# Patient Record
Sex: MALE | Race: WHITE | NOT HISPANIC OR LATINO | Employment: FULL TIME | URBAN - METROPOLITAN AREA
[De-identification: names, ages, dates, MRNs, and addresses within clinical notes are randomized per-mention and may not be internally consistent; named-entity substitution may affect disease eponyms.]

---

## 2017-02-01 ENCOUNTER — ALLSCRIPTS OFFICE VISIT (OUTPATIENT)
Dept: OTHER | Facility: OTHER | Age: 47
End: 2017-02-01

## 2017-02-01 DIAGNOSIS — Z13.1 ENCOUNTER FOR SCREENING FOR DIABETES MELLITUS: ICD-10-CM

## 2017-02-01 DIAGNOSIS — R53.83 OTHER FATIGUE: ICD-10-CM

## 2017-02-01 DIAGNOSIS — E78.5 HYPERLIPIDEMIA: ICD-10-CM

## 2017-02-21 ENCOUNTER — GENERIC CONVERSION - ENCOUNTER (OUTPATIENT)
Dept: OTHER | Facility: OTHER | Age: 47
End: 2017-02-21

## 2017-02-21 LAB
AMBIG ABBREV CMP14 DEFAULT (HISTORICAL): NORMAL
AMBIG ABBREV LP DEFAULT (HISTORICAL): NORMAL

## 2017-02-22 LAB
A/G RATIO (HISTORICAL): 1.6 (ref 1.1–2.5)
ALBUMIN SERPL BCP-MCNC: 4.4 G/DL (ref 3.5–5.5)
ALP SERPL-CCNC: 54 IU/L (ref 39–117)
ALT SERPL W P-5'-P-CCNC: 23 IU/L (ref 0–44)
AST SERPL W P-5'-P-CCNC: 21 IU/L (ref 0–40)
BASOPHILS # BLD AUTO: 0 %
BASOPHILS # BLD AUTO: 0 X10E3/UL (ref 0–0.2)
BILIRUB SERPL-MCNC: 0.4 MG/DL (ref 0–1.2)
BUN SERPL-MCNC: 12 MG/DL (ref 6–24)
BUN/CREA RATIO (HISTORICAL): 14 (ref 9–20)
CALCIUM SERPL-MCNC: 9.2 MG/DL (ref 8.7–10.2)
CHLORIDE SERPL-SCNC: 100 MMOL/L (ref 96–106)
CHOLEST SERPL-MCNC: 248 MG/DL (ref 100–199)
CO2 SERPL-SCNC: 25 MMOL/L (ref 18–29)
CREAT SERPL-MCNC: 0.87 MG/DL (ref 0.76–1.27)
DEPRECATED RDW RBC AUTO: 14.1 % (ref 12.3–15.4)
EGFR AFRICAN AMERICAN (HISTORICAL): 119 ML/MIN/1.73
EGFR-AMERICAN CALC (HISTORICAL): 103 ML/MIN/1.73
EOSINOPHIL # BLD AUTO: 0.3 X10E3/UL (ref 0–0.4)
EOSINOPHIL # BLD AUTO: 4 %
GLUCOSE SERPL-MCNC: 107 MG/DL (ref 65–99)
HCT VFR BLD AUTO: 50.8 % (ref 37.5–51)
HDLC SERPL-MCNC: 51 MG/DL
HGB BLD-MCNC: 17.4 G/DL (ref 12.6–17.7)
IMM.GRANULOCYTES (CD4/8) (HISTORICAL): 0 %
IMM.GRANULOCYTES (CD4/8) (HISTORICAL): 0 X10E3/UL (ref 0–0.1)
LDLC SERPL CALC-MCNC: 149 MG/DL (ref 0–99)
LYMPHOCYTES # BLD AUTO: 2.8 X10E3/UL (ref 0.7–3.1)
LYMPHOCYTES # BLD AUTO: 38 %
MCH RBC QN AUTO: 32.4 PG (ref 26.6–33)
MCHC RBC AUTO-ENTMCNC: 34.3 G/DL (ref 31.5–35.7)
MCV RBC AUTO: 95 FL (ref 79–97)
MONOCYTES # BLD AUTO: 0.6 X10E3/UL (ref 0.1–0.9)
MONOCYTES (HISTORICAL): 9 %
NEUTROPHILS # BLD AUTO: 3.6 X10E3/UL (ref 1.4–7)
NEUTROPHILS # BLD AUTO: 49 %
PLATELET # BLD AUTO: 219 X10E3/UL (ref 150–379)
POTASSIUM SERPL-SCNC: 4.5 MMOL/L (ref 3.5–5.2)
RBC (HISTORICAL): 5.37 X10E6/UL (ref 4.14–5.8)
SODIUM SERPL-SCNC: 142 MMOL/L (ref 134–144)
TOT. GLOBULIN, SERUM (HISTORICAL): 2.7 G/DL (ref 1.5–4.5)
TOTAL PROTEIN (HISTORICAL): 7.1 G/DL (ref 6–8.5)
TRIGL SERPL-MCNC: 241 MG/DL (ref 0–149)
TSH SERPL DL<=0.05 MIU/L-ACNC: 2.99 UIU/ML (ref 0.45–4.5)
VLDLC SERPL CALC-MCNC: 48 MG/DL (ref 5–40)
WBC # BLD AUTO: 7.4 X10E3/UL (ref 3.4–10.8)

## 2017-10-30 ENCOUNTER — ALLSCRIPTS OFFICE VISIT (OUTPATIENT)
Dept: OTHER | Facility: OTHER | Age: 47
End: 2017-10-30

## 2017-10-30 DIAGNOSIS — M54.41 LOW BACK PAIN WITH RIGHT-SIDED SCIATICA: ICD-10-CM

## 2017-10-31 NOTE — PROGRESS NOTES
Assessment  1  Right-sided low back pain with sciatica (724 3) (M54 41)    Plan  Health Maintenance    · You need to quit smoking ; Status:Complete - Retrospective Authorization;   Done:  [id number]  Right-sided low back pain with sciatica    · Start: Cyclobenzaprine HCl - 10 MG Oral Tablet; TAKE 1 TABLET AT BEDTIME   · Start: Meloxicam 15 MG Oral Tablet; TAKE 1 TABLET DAILY WITH FOOD   · *1 - [facility]  *  Status: Active  Requested for: [id number]  () Care Summary provided  : Yes    Discussion/Summary    Right sided sciatica- after discussing risks and benefits of medication with patient along with side effects will start meloxicam 15 mg PO once daily along with cyclobenzaprine 10 mg PO at bedtime  Also referred for physical therapy  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 and to be re-evaluated on Nov [address], 2017  up in 1 week  Possible side effects of new medications were reviewed with the patient/guardian today  The treatment plan was reviewed with the patient/guardian  The patient/guardian understands and agrees with the treatment plan      Chief Complaint  Patient is here today with complaints of back issues      History of Present Illness  Patient is here because he has been having pain on his right leg and going down his right leg  He says that all the symptoms started on Friday night all of the sudden  He says that he felt pain several months on his right gluteal area  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  says that he has a distant history of lumbar disc herniation per patient  says that he has been taking ibuprofen 400 mg PO twice daily today  Review of Systems    Constitutional: No fever or chills, feels well, no tiredness, no recent weight gain or weight loss  Eyes: No complaints of eye pain, no red eyes, no discharge from eyes, no itchy eyes     Cardiovascular: No complaints of slow heart rate, no fast heart rate, no chest pain, no palpitations, no leg claudication, no lower extremity  Respiratory: No complaints of shortness of breath, no wheezing, no cough, no SOB on exertion, no orthopnea or PND  Gastrointestinal: No complaints of abdominal pain, no constipation, no nausea or vomiting, no diarrhea or bloody stools  Musculoskeletal: as noted in HPI  Integumentary: No complaints of skin rash or skin lesions, no itching, no skin wound, no dry skin  Neurological: No compliants of headache, no confusion, no convulsions, no numbness or tingling, no dizziness or fainting, no limb weakness, no difficulty walking  Endocrine: No complaints of proptosis, no hot flashes, no muscle weakness, no erectile dysfunction, no deepening of the voice, no feelings of weakness  ROS reviewed  Active Problems  1  Abdominal pain, bilateral lower quadrant (789 03,789 04) (R10 31,R10 32)  2  Anhedonia (780 99) (R45 84)  3  Asthmatic bronchitis (493 90) (J45 909)  4  Chest tightness or pressure (786 59) (R07 89)  5  Diabetes mellitus screening (V77 1) (Z13 1)  6  Erectile dysfunction (607 84) (N52 9)  7  Fatigue (780 79) (R53 83)  8  Frequent loose stools (787 91) (R19 7)  9  Headache (784 0) (R51)  10  Hypercholesteremia (272 0) (E78 00)  11  Hyperlipidemia (272 4) (E78 5)  12  Need for influenza vaccination (V04 81) (Z23)  13  Nonsustained ventricular tachycardia (427 1) (I47 2)  14  Palpitations (785 1) (R00 2)  15  Paroxysmal supraventricular tachycardia (427 0) (I47 1)  16  Right wrist pain (719 43) (M25 531)  17  Seasonal allergies (477 9) (J30 2)  18  Tobacco use (305 1) (Z72 0)  19  Wrist fracture (814 00) (S62 109A)    Past Medical History    The active problems and past medical history were reviewed and updated today  Family History  Mother   1  Family history of Benign essential hypertension  2  Family history of diabetes mellitus (V18 0) (Z83 3)  3  Family history of Kidney carcinoma  Father   4  Family history of Benign essential hypertension  5  Family history of coronary artery disease (V17 3) (Z82 49)  6  Family history of myocardial infarction (V17 3) (Z82 49)  Maternal Grandmother   7  Family history of diabetes mellitus (V18 0) (Z83 3)  Paternal Grandmother   6  Family history of malignant neoplasm of breast (V16 3) (Z80 3)  Maternal Grandfather   9  Family history of diabetes mellitus (V18 0) (Z83 3)    Social History   · Alcohol use   · Disabled   ·    · No drug use   · Occasional alcohol use   · Tobacco use (305 1) (Z72 0)    Current Meds  1  Aspirin 81 MG TABS; TAKE 1 TABLET DAILY; Therapy: 39FHD9149 to (Evaluate:22Nov2014); Last Rx:[id number] Ordered  2  Viagra 50 MG Oral Tablet; TAKE 1 TABLET DAILY 1 HOUR BEFORE NEEDED; Therapy: 40ZJM7667 to (Evaluate:78Lig2270)  Requested for: 13HLF6447; Last   Rx:[id number] Ordered    Allergies  1  epinephrine    Vitals  Vital Signs    Recorded: 30Oct2017 01:17PM   Temperature 98 4 F   Heart Rate 79   Systolic 375   Diastolic 82   Height 5 ft 9 in   Weight 187 lb 8 0 oz   BMI Calculated 27 69   BSA Calculated 2 01   O2 Saturation 96     Physical Exam    Constitutional   General appearance: No acute distress, well appearing and well nourished  Eyes   Conjunctiva and lids: No swelling, erythema, or discharge  Pulmonary   Respiratory effort: No increased work of breathing or signs of respiratory distress  Auscultation of lungs: Clear to auscultation, equal breath sounds bilaterally, no wheezes, no rales, no rhonci  Cardiovascular   Auscultation of heart: Normal rate and rhythm, normal S1 and S2, without murmurs  Musculoskeletal   Gait and station: Abnormal  -- unable to sit due to pain  -- straight leg raise produces tenderness of gluteal area, left straight leg raise does not produce any tenderness  Skin   Skin and subcutaneous tissue: Normal without rashes or lesions      Psychiatric   Orientation to person, place and time: Normal          Health Management  Abdominal pain, bilateral lower quadrant   COLONOSCOPY; every 10 years; Last 11Aug2015; Next Due: 11Aug2025; Active    Message    Arnaldo Louis is under my professional care   He was seen in my office on 10/30/217   He is able to return to work on  11/06/2017            Signatures   Electronically signed by : Horacio Tenorio MD; Oct 30 2017  1:44PM EST                       (Author)    Electronically signed by : Horacio Tenorio MD; Oct 30 2017  1:45PM EST                       (Author)

## 2018-01-14 VITALS
HEART RATE: 78 BPM | HEIGHT: 69 IN | BODY MASS INDEX: 28.01 KG/M2 | OXYGEN SATURATION: 97 % | WEIGHT: 189.13 LBS | DIASTOLIC BLOOD PRESSURE: 80 MMHG | TEMPERATURE: 97.4 F | SYSTOLIC BLOOD PRESSURE: 118 MMHG

## 2018-01-14 VITALS
BODY MASS INDEX: 27.77 KG/M2 | OXYGEN SATURATION: 96 % | HEIGHT: 69 IN | SYSTOLIC BLOOD PRESSURE: 132 MMHG | HEART RATE: 79 BPM | TEMPERATURE: 98.4 F | WEIGHT: 187.5 LBS | DIASTOLIC BLOOD PRESSURE: 82 MMHG

## 2018-01-15 NOTE — MISCELLANEOUS
Message  Return to work or school:   Verne Cheadle is under my professional care   He was seen in my office on 10/30/217   He is able to return to work on  11/06/2017            Signatures   Electronically signed by : Rajesh Qiunteros MD; Oct 30 2017  1:44PM EST                       (Author)    Electronically signed by : Rajesh Quinteros MD; Oct 30 2017  1:45PM EST                       (Author)

## 2019-02-08 DIAGNOSIS — J01.00 ACUTE NON-RECURRENT MAXILLARY SINUSITIS: Primary | ICD-10-CM

## 2019-02-08 RX ORDER — AZITHROMYCIN 250 MG/1
250 TABLET, FILM COATED ORAL EVERY 24 HOURS
Qty: 5 TABLET | Refills: 0 | Status: SHIPPED | OUTPATIENT
Start: 2019-02-08 | End: 2019-02-13

## 2020-03-20 ENCOUNTER — TELEPHONE (OUTPATIENT)
Dept: FAMILY MEDICINE CLINIC | Facility: CLINIC | Age: 50
End: 2020-03-20

## 2020-03-20 DIAGNOSIS — R09.81 SINUS CONGESTION: Primary | ICD-10-CM

## 2020-03-20 RX ORDER — FLUTICASONE PROPIONATE 50 MCG
1 SPRAY, SUSPENSION (ML) NASAL DAILY
Qty: 1 BOTTLE | Refills: 1 | Status: SHIPPED | OUTPATIENT
Start: 2020-03-20 | End: 2021-10-07 | Stop reason: ALTCHOICE

## 2020-03-20 NOTE — TELEPHONE ENCOUNTER
Pt calls with his usual seasonal allergies to include sore throat, sinus congestion, slight cough, phleghm   No fever,  [facility]     Thanks rb

## 2021-04-08 DIAGNOSIS — Z23 ENCOUNTER FOR IMMUNIZATION: ICD-10-CM

## 2021-08-17 ENCOUNTER — TELEPHONE (OUTPATIENT)
Dept: ADMINISTRATIVE | Facility: OTHER | Age: 51
End: 2021-08-17

## 2021-08-17 ENCOUNTER — APPOINTMENT (OUTPATIENT)
Dept: LAB | Facility: CLINIC | Age: 51
End: 2021-08-17
Payer: COMMERCIAL

## 2021-08-17 ENCOUNTER — OFFICE VISIT (OUTPATIENT)
Dept: FAMILY MEDICINE CLINIC | Facility: CLINIC | Age: 51
End: 2021-08-17
Payer: COMMERCIAL

## 2021-08-17 VITALS
SYSTOLIC BLOOD PRESSURE: 118 MMHG | WEIGHT: 185.4 LBS | DIASTOLIC BLOOD PRESSURE: 74 MMHG | TEMPERATURE: 97.8 F | HEART RATE: 74 BPM | BODY MASS INDEX: 27.46 KG/M2 | HEIGHT: 69 IN | OXYGEN SATURATION: 99 %

## 2021-08-17 DIAGNOSIS — Z13.1 SCREENING FOR DIABETES MELLITUS: ICD-10-CM

## 2021-08-17 DIAGNOSIS — Z12.5 SCREENING FOR PROSTATE CANCER: ICD-10-CM

## 2021-08-17 DIAGNOSIS — F17.210 CIGARETTE NICOTINE DEPENDENCE WITHOUT COMPLICATION: ICD-10-CM

## 2021-08-17 DIAGNOSIS — Z13.220 NEED FOR LIPID SCREENING: ICD-10-CM

## 2021-08-17 DIAGNOSIS — Z00.00 HEALTH MAINTENANCE EXAMINATION: Primary | ICD-10-CM

## 2021-08-17 DIAGNOSIS — B35.1 ONYCHOMYCOSIS: ICD-10-CM

## 2021-08-17 LAB
ALBUMIN SERPL BCP-MCNC: 3.7 G/DL (ref 3.5–5)
ALP SERPL-CCNC: 53 U/L (ref 46–116)
ALT SERPL W P-5'-P-CCNC: 29 U/L (ref 12–78)
ANION GAP SERPL CALCULATED.3IONS-SCNC: 2 MMOL/L (ref 4–13)
AST SERPL W P-5'-P-CCNC: 20 U/L (ref 5–45)
BILIRUB SERPL-MCNC: 0.45 MG/DL (ref 0.2–1)
BUN SERPL-MCNC: 12 MG/DL (ref 5–25)
CALCIUM SERPL-MCNC: 8.8 MG/DL (ref 8.3–10.1)
CHLORIDE SERPL-SCNC: 107 MMOL/L (ref 100–108)
CHOLEST SERPL-MCNC: 216 MG/DL (ref 50–200)
CO2 SERPL-SCNC: 28 MMOL/L (ref 21–32)
CREAT SERPL-MCNC: 0.81 MG/DL (ref 0.6–1.3)
GFR SERPL CREATININE-BSD FRML MDRD: 103 ML/MIN/1.73SQ M
GLUCOSE P FAST SERPL-MCNC: 102 MG/DL (ref 65–99)
HDLC SERPL-MCNC: 50 MG/DL
LDLC SERPL CALC-MCNC: 132 MG/DL (ref 0–100)
NONHDLC SERPL-MCNC: 166 MG/DL
POTASSIUM SERPL-SCNC: 4.1 MMOL/L (ref 3.5–5.3)
PROT SERPL-MCNC: 7.5 G/DL (ref 6.4–8.2)
PSA SERPL-MCNC: 0.8 NG/ML (ref 0–4)
SODIUM SERPL-SCNC: 137 MMOL/L (ref 136–145)
TRIGL SERPL-MCNC: 170 MG/DL

## 2021-08-17 PROCEDURE — 99396 PREV VISIT EST AGE 40-64: CPT | Performed by: FAMILY MEDICINE

## 2021-08-17 PROCEDURE — G0103 PSA SCREENING: HCPCS

## 2021-08-17 PROCEDURE — 80061 LIPID PANEL: CPT

## 2021-08-17 PROCEDURE — 36415 COLL VENOUS BLD VENIPUNCTURE: CPT

## 2021-08-17 PROCEDURE — 80053 COMPREHEN METABOLIC PANEL: CPT

## 2021-08-17 RX ORDER — TERBINAFINE HYDROCHLORIDE 250 MG/1
250 TABLET ORAL DAILY
Qty: 30 TABLET | Refills: 2 | Status: SHIPPED | OUTPATIENT
Start: 2021-08-17 | End: 2021-09-16

## 2021-08-17 RX ORDER — ASPIRIN 81 MG/1
81 TABLET ORAL DAILY
COMMUNITY

## 2021-08-17 NOTE — PROGRESS NOTES
Assessment/Plan:    No problem-specific Assessment & Plan notes found for this encounter  Diagnoses and all orders for this visit:    Health maintenance examination  Normal exam see below    Cigarette nicotine dependence without complication  -     CT lung screening program; Future    Screening for diabetes mellitus  -     Comprehensive metabolic panel; Future    Need for lipid screening  -     Lipid panel; Future    Screening for prostate cancer  -     PSA, Total Screen; Future    Onychomycosis  -     terbinafine (LamISIL) 250 mg tablet; Take 1 tablet (250 mg total) by mouth daily    Other orders  -     aspirin (ECOTRIN LOW STRENGTH) 81 mg EC tablet; Take 81 mg by mouth daily      Follow up in 1 year or as needed    Subjective:      Patient ID: Jose Angel Sunshine is a 45 y o  male  Patient is here for a yearly physical   He is a current every day smoker not ready to quit  Has bilateral toenail fungus infection  The following portions of the patient's history were reviewed and updated as appropriate:   He  has no past medical history on file  He   Patient Active Problem List    Diagnosis Date Noted    Health maintenance examination 08/17/2021     He  has no past surgical history on file  His family history is not on file  He  reports that he has been smoking  He has never used smokeless tobacco  No history on file for alcohol use and drug use  Current Outpatient Medications   Medication Sig Dispense Refill    aspirin (ECOTRIN LOW STRENGTH) 81 mg EC tablet Take 81 mg by mouth daily      fluticasone (FLONASE) 50 mcg/act nasal spray 1 spray into each nostril daily (Patient not taking: Reported on 8/17/2021) 1 Bottle 1    terbinafine (LamISIL) 250 mg tablet Take 1 tablet (250 mg total) by mouth daily 30 tablet 2     No current facility-administered medications for this visit       Current Outpatient Medications on File Prior to Visit   Medication Sig    aspirin (ECOTRIN LOW STRENGTH) 81 mg EC tablet Take 81 mg by mouth daily    fluticasone (FLONASE) 50 mcg/act nasal spray 1 spray into each nostril daily (Patient not taking: Reported on 8/17/2021)     No current facility-administered medications on file prior to visit  He is allergic to ephedrine       Review of Systems   Constitutional: Negative for activity change, appetite change, fatigue and fever  HENT: Negative for congestion and ear discharge  Respiratory: Negative for cough and shortness of breath  Cardiovascular: Negative for chest pain and palpitations  Gastrointestinal: Negative for diarrhea and nausea  Musculoskeletal: Negative for arthralgias and back pain  Skin: Negative for color change and rash  Neurological: Negative for dizziness and headaches  Psychiatric/Behavioral: Negative for agitation and behavioral problems  Objective:      /74   Pulse 74   Temp 97 8 °F (36 6 °C) (Temporal)   Ht 5' 9" (1 753 m)   Wt 84 1 kg (185 lb 6 4 oz)   SpO2 99%   BMI 27 38 kg/m²          Physical Exam  Constitutional:       General: He is not in acute distress  Appearance: He is well-developed  He is not diaphoretic  Eyes:      General: No scleral icterus  Pupils: Pupils are equal, round, and reactive to light  Cardiovascular:      Rate and Rhythm: Normal rate and regular rhythm  Heart sounds: Normal heart sounds  No murmur heard  Pulmonary:      Effort: Pulmonary effort is normal  No respiratory distress  Breath sounds: Normal breath sounds  No wheezing  Abdominal:      General: Bowel sounds are normal  There is no distension  Palpations: Abdomen is soft  Tenderness: There is no abdominal tenderness  Skin:     General: Skin is warm and dry  Findings: No rash  Comments: Bilateral toenails yellow and thickened   Neurological:      Mental Status: He is alert and oriented to person, place, and time

## 2021-08-17 NOTE — TELEPHONE ENCOUNTER
----- Message from Sunday Pritchard sent at 8/17/2021  8:24 AM EDT -----  Regarding: Colonoscopy  08/17/21 8:25 AM    Hello, our patient Mukesh De La Vega has had CRC: Colonoscopy completed/performed  Please assist in updating the patient chart by pulling the document from the Media Tab  The date of service is 8/11/2015       Thank you,  Sunday [facility]

## 2021-08-17 NOTE — PROGRESS NOTES
BMI Counseling: Body mass index is 27 38 kg/m²  The BMI is above normal  Nutrition recommendations include reducing portion sizes, decreasing overall calorie intake and 3-5 servings of fruits/vegetables daily  Exercise recommendations include exercising 3-5 times per week

## 2021-08-17 NOTE — TELEPHONE ENCOUNTER
Upon review of the In Basket request we were able to locate, review, and update the patient chart as requested for CRC: Colonoscopy  Any additional questions or concerns should be emailed to the Practice Liaisons via [email or web address] email, please do not reply via In Basket      Thank you  Teresa Toribio

## 2021-10-06 ENCOUNTER — TELEPHONE (OUTPATIENT)
Dept: FAMILY MEDICINE CLINIC | Facility: CLINIC | Age: 51
End: 2021-10-06

## 2021-10-06 DIAGNOSIS — R51.9 NONINTRACTABLE HEADACHE, UNSPECIFIED CHRONICITY PATTERN, UNSPECIFIED HEADACHE TYPE: Primary | ICD-10-CM

## 2021-10-06 RX ORDER — ACETAMINOPHEN, ASPIRIN AND CAFFEINE 250; 250; 65 MG/1; MG/1; MG/1
1 TABLET, FILM COATED ORAL EVERY 6 HOURS PRN
Qty: 30 TABLET | Refills: 0 | Status: SHIPPED | OUTPATIENT
Start: 2021-10-06

## 2021-10-07 ENCOUNTER — OFFICE VISIT (OUTPATIENT)
Dept: FAMILY MEDICINE CLINIC | Facility: CLINIC | Age: 51
End: 2021-10-07
Payer: COMMERCIAL

## 2021-10-07 VITALS
OXYGEN SATURATION: 97 % | DIASTOLIC BLOOD PRESSURE: 90 MMHG | HEIGHT: 69 IN | WEIGHT: 185 LBS | SYSTOLIC BLOOD PRESSURE: 140 MMHG | HEART RATE: 97 BPM | TEMPERATURE: 97.5 F | BODY MASS INDEX: 27.4 KG/M2

## 2021-10-07 DIAGNOSIS — G44.52 NEW DAILY PERSISTENT HEADACHE: ICD-10-CM

## 2021-10-07 DIAGNOSIS — I10 ESSENTIAL HYPERTENSION: Primary | ICD-10-CM

## 2021-10-07 PROCEDURE — 99214 OFFICE O/P EST MOD 30 MIN: CPT | Performed by: FAMILY MEDICINE

## 2021-10-07 RX ORDER — PROPRANOLOL HYDROCHLORIDE 40 MG/1
40 TABLET ORAL 2 TIMES DAILY
Qty: 60 TABLET | Refills: 1 | Status: SHIPPED | OUTPATIENT
Start: 2021-10-07 | End: 2021-10-22 | Stop reason: SDUPTHER

## 2021-10-22 ENCOUNTER — HOSPITAL ENCOUNTER (OUTPATIENT)
Dept: CT IMAGING | Facility: HOSPITAL | Age: 51
Discharge: HOME | End: 2021-10-22
Attending: FAMILY MEDICINE
Payer: COMMERCIAL

## 2021-10-22 ENCOUNTER — OFFICE VISIT (OUTPATIENT)
Dept: FAMILY MEDICINE CLINIC | Facility: CLINIC | Age: 51
End: 2021-10-22
Payer: OTHER MISCELLANEOUS

## 2021-10-22 VITALS
WEIGHT: 184 LBS | HEART RATE: 68 BPM | HEIGHT: 69 IN | DIASTOLIC BLOOD PRESSURE: 92 MMHG | BODY MASS INDEX: 27.25 KG/M2 | TEMPERATURE: 97 F | SYSTOLIC BLOOD PRESSURE: 136 MMHG | OXYGEN SATURATION: 98 %
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
## 2021-10-22 DIAGNOSIS — T14.90XA TRAUMA: ICD-10-CM

## 2021-10-22 DIAGNOSIS — R10.12 LEFT UPPER QUADRANT ABDOMINAL PAIN: ICD-10-CM
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
## 2021-10-22 PROCEDURE — 74150 CT ABDOMEN W/O CONTRAST: CPT

## 2021-10-22 PROCEDURE — G1004 CDSM NDSC: HCPCS

## 2021-10-22 PROCEDURE — 99214 OFFICE O/P EST MOD 30 MIN: CPT | Performed by: FAMILY MEDICINE

## 2021-10-22 RX ORDER — IBUPROFEN 800 MG/1
800 TABLET ORAL EVERY 8 HOURS PRN
Qty: 30 TABLET | Refills: 1 | Status: SHIPPED | OUTPATIENT
Start: 2021-10-22 | End: 2021-11-05 | Stop reason: SDUPTHER

## 2021-10-22 RX ORDER — PROPRANOLOL HYDROCHLORIDE 40 MG/1
40 TABLET ORAL DAILY
Qty: 30 TABLET | Refills: 3
Start: 2021-10-22 | End: 2021-10-29

## 2021-10-25 ENCOUNTER — TELEPHONE (OUTPATIENT)
Dept: FAMILY MEDICINE CLINIC | Facility: CLINIC | Age: 51
End: 2021-10-25

## 2021-10-26 ENCOUNTER — DOCUMENTATION (OUTPATIENT)
Dept: FAMILY MEDICINE CLINIC | Facility: CLINIC | Age: 51
End: 2021-10-26

## 2021-10-29 ENCOUNTER — APPOINTMENT (OUTPATIENT)
Dept: RADIOLOGY | Facility: CLINIC | Age: 51
End: 2021-10-29
Payer: OTHER MISCELLANEOUS

## 2021-10-29 ENCOUNTER — OFFICE VISIT (OUTPATIENT)
Dept: FAMILY MEDICINE CLINIC | Facility: CLINIC | Age: 51
End: 2021-10-29
Payer: OTHER MISCELLANEOUS

## 2021-10-29 VITALS
HEIGHT: 69 IN | DIASTOLIC BLOOD PRESSURE: 92 MMHG | SYSTOLIC BLOOD PRESSURE: 136 MMHG | HEART RATE: 84 BPM | TEMPERATURE: 96.4 F | BODY MASS INDEX: 27.11 KG/M2 | WEIGHT: 183 LBS | OXYGEN SATURATION: 99 %

## 2021-10-29 DIAGNOSIS — R07.81 RIB PAIN ON LEFT SIDE: ICD-10-CM
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
## 2021-10-29 DIAGNOSIS — T14.90XA TRAUMA: Primary | ICD-10-CM

## 2021-10-29 PROCEDURE — 71101 X-RAY EXAM UNILAT RIBS/CHEST: CPT

## 2021-10-29 PROCEDURE — 99213 OFFICE O/P EST LOW 20 MIN: CPT | Performed by: FAMILY MEDICINE

## 2021-11-05 ENCOUNTER — OFFICE VISIT (OUTPATIENT)
Dept: FAMILY MEDICINE CLINIC | Facility: CLINIC | Age: 51
End: 2021-11-05
Payer: OTHER MISCELLANEOUS

## 2021-11-05 VITALS
OXYGEN SATURATION: 99 % | BODY MASS INDEX: 27.99 KG/M2 | WEIGHT: 189 LBS | SYSTOLIC BLOOD PRESSURE: 118 MMHG | DIASTOLIC BLOOD PRESSURE: 80 MMHG | TEMPERATURE: 77 F | HEIGHT: 69 IN | HEART RATE: 97 BPM

## 2021-11-05 DIAGNOSIS — S80.811D ABRASION OF RIGHT LOWER EXTREMITY, SUBSEQUENT ENCOUNTER: ICD-10-CM

## 2021-11-05 DIAGNOSIS — S20.212S RIB CONTUSION, LEFT, SEQUELA: ICD-10-CM
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
## 2021-11-05 DIAGNOSIS — M25.561 ACUTE PAIN OF RIGHT KNEE: ICD-10-CM

## 2021-11-05 PROBLEM — S80.811A ABRASION OF RIGHT LEG: Status: ACTIVE | Noted: 2021-11-05

## 2021-11-05 PROCEDURE — 99214 OFFICE O/P EST MOD 30 MIN: CPT | Performed by: FAMILY MEDICINE

## 2021-11-05 RX ORDER — IBUPROFEN 800 MG/1
800 TABLET ORAL EVERY 8 HOURS PRN
Qty: 60 TABLET | Refills: 3 | Status: SHIPPED | OUTPATIENT
Start: 2021-11-05 | End: 2022-02-21 | Stop reason: ALTCHOICE

## 2021-11-05 RX ORDER — PROPRANOLOL HYDROCHLORIDE 40 MG/1
TABLET ORAL
COMMUNITY
Start: 2021-11-03 | End: 2021-12-01

## 2021-11-08 ENCOUNTER — TELEPHONE (OUTPATIENT)
Dept: FAMILY MEDICINE CLINIC | Facility: CLINIC | Age: 51
End: 2021-11-08

## 2021-11-09 VITALS
HEART RATE: 79 BPM | BODY MASS INDEX: 27.37 KG/M2 | DIASTOLIC BLOOD PRESSURE: 78 MMHG | SYSTOLIC BLOOD PRESSURE: 120 MMHG | HEIGHT: 69 IN | WEIGHT: 184.8 LBS

## 2021-11-09 DIAGNOSIS — S80.11XA CONTUSION OF RIGHT LOWER LEG, INITIAL ENCOUNTER: ICD-10-CM

## 2021-11-09 DIAGNOSIS — S89.91XA ACUTE INJURY OF RIGHT KNEE CARTILAGE, INITIAL ENCOUNTER: Primary | ICD-10-CM

## 2021-11-09 DIAGNOSIS — S89.92XA LEFT KNEE INJURY, INITIAL ENCOUNTER: ICD-10-CM

## 2021-11-09 PROCEDURE — 99203 OFFICE O/P NEW LOW 30 MIN: CPT | Performed by: FAMILY MEDICINE

## 2021-11-17 ENCOUNTER — OFFICE VISIT (OUTPATIENT)
Dept: FAMILY MEDICINE CLINIC | Facility: CLINIC | Age: 51
End: 2021-11-17
Payer: OTHER MISCELLANEOUS

## 2021-11-17 VITALS
HEART RATE: 83 BPM | SYSTOLIC BLOOD PRESSURE: 133 MMHG | BODY MASS INDEX: 27.25 KG/M2 | WEIGHT: 184 LBS | HEIGHT: 69 IN | DIASTOLIC BLOOD PRESSURE: 86 MMHG

## 2021-11-17 VITALS
TEMPERATURE: 97.4 F | BODY MASS INDEX: 27.7 KG/M2 | DIASTOLIC BLOOD PRESSURE: 81 MMHG | OXYGEN SATURATION: 97 % | WEIGHT: 187 LBS | HEART RATE: 89 BPM | HEIGHT: 69 IN | SYSTOLIC BLOOD PRESSURE: 137 MMHG

## 2021-11-17 DIAGNOSIS — R07.81 RIB PAIN ON LEFT SIDE: ICD-10-CM

## 2021-11-17 DIAGNOSIS — S89.92XD LEFT KNEE INJURY, SUBSEQUENT ENCOUNTER: ICD-10-CM
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
## 2021-11-17 DIAGNOSIS — S83.511D SPRAIN OF ANTERIOR CRUCIATE LIGAMENT OF RIGHT KNEE, SUBSEQUENT ENCOUNTER: Primary | ICD-10-CM

## 2021-11-17 DIAGNOSIS — S80.01XD CONTUSION OF RIGHT KNEE, SUBSEQUENT ENCOUNTER: ICD-10-CM

## 2021-11-17 DIAGNOSIS — T14.8XXA CONTUSION OF BONE: ICD-10-CM

## 2021-11-17 DIAGNOSIS — M25.562 ACUTE PAIN OF LEFT KNEE: ICD-10-CM

## 2021-11-17 PROBLEM — R10.12 LEFT UPPER QUADRANT ABDOMINAL PAIN: Status: RESOLVED | Noted: 2021-10-22 | Resolved: 2021-11-17

## 2021-11-17 PROCEDURE — 99213 OFFICE O/P EST LOW 20 MIN: CPT | Performed by: FAMILY MEDICINE

## 2021-11-17 RX ORDER — LIDOCAINE 50 MG/G
1 PATCH TOPICAL DAILY
Qty: 30 PATCH | Refills: 0 | Status: SHIPPED | OUTPATIENT
Start: 2021-11-17

## 2021-11-22 RX ORDER — LIDOCAINE 40 MG/G
CREAM TOPICAL AS NEEDED
Qty: 45 G | Refills: 2 | Status: SHIPPED | OUTPATIENT
Start: 2021-11-22 | End: 2021-12-17 | Stop reason: SDUPTHER

## 2021-11-30 DIAGNOSIS — I10 ESSENTIAL (PRIMARY) HYPERTENSION: ICD-10-CM

## 2021-11-30 DIAGNOSIS — G44.52 NEW DAILY PERSISTENT HEADACHE (NDPH): ICD-10-CM

## 2021-12-01 RX ORDER — PROPRANOLOL HYDROCHLORIDE 40 MG/1
TABLET ORAL
Qty: 60 TABLET | Refills: 1 | Status: SHIPPED | OUTPATIENT
Start: 2021-12-01 | End: 2022-01-24

## 2021-12-02 ENCOUNTER — OFFICE VISIT (OUTPATIENT)
Dept: FAMILY MEDICINE CLINIC | Facility: CLINIC | Age: 51
End: 2021-12-02
Payer: OTHER MISCELLANEOUS

## 2021-12-02 VITALS
WEIGHT: 187 LBS | OXYGEN SATURATION: 100 % | HEART RATE: 63 BPM | TEMPERATURE: 98.3 F | BODY MASS INDEX: 27.7 KG/M2 | SYSTOLIC BLOOD PRESSURE: 138 MMHG | DIASTOLIC BLOOD PRESSURE: 74 MMHG | HEIGHT: 69 IN

## 2021-12-02 DIAGNOSIS — R07.81 RIB PAIN ON LEFT SIDE: Primary | ICD-10-CM

## 2021-12-02 DIAGNOSIS — M25.561 ACUTE PAIN OF RIGHT KNEE: ICD-10-CM

## 2021-12-02 PROCEDURE — 99213 OFFICE O/P EST LOW 20 MIN: CPT | Performed by: FAMILY MEDICINE

## 2021-12-17 VITALS
HEART RATE: 76 BPM | HEIGHT: 69 IN | DIASTOLIC BLOOD PRESSURE: 87 MMHG | WEIGHT: 188 LBS | BODY MASS INDEX: 27.85 KG/M2 | SYSTOLIC BLOOD PRESSURE: 126 MMHG

## 2021-12-17 DIAGNOSIS — S83.511D SPRAIN OF ANTERIOR CRUCIATE LIGAMENT OF RIGHT KNEE, SUBSEQUENT ENCOUNTER: Primary | ICD-10-CM

## 2021-12-17 DIAGNOSIS — S80.01XD CONTUSION OF RIGHT KNEE, SUBSEQUENT ENCOUNTER: ICD-10-CM

## 2021-12-17 DIAGNOSIS — S89.92XD LEFT KNEE INJURY, SUBSEQUENT ENCOUNTER: ICD-10-CM

## 2021-12-17 DIAGNOSIS — T14.8XXA CONTUSION OF BONE: ICD-10-CM

## 2021-12-17 DIAGNOSIS — R07.81 RIB PAIN ON LEFT SIDE: ICD-10-CM

## 2021-12-17 PROCEDURE — 99213 OFFICE O/P EST LOW 20 MIN: CPT | Performed by: FAMILY MEDICINE

## 2021-12-17 RX ORDER — LIDOCAINE 40 MG/G
CREAM TOPICAL AS NEEDED
Qty: 120 G | Refills: 2 | Status: SHIPPED | OUTPATIENT
Start: 2021-12-17

## 2021-12-20 ENCOUNTER — OFFICE VISIT (OUTPATIENT)
Dept: FAMILY MEDICINE CLINIC | Facility: CLINIC | Age: 51
End: 2021-12-20
Payer: OTHER MISCELLANEOUS

## 2021-12-20 VITALS
DIASTOLIC BLOOD PRESSURE: 76 MMHG | HEIGHT: 69 IN | SYSTOLIC BLOOD PRESSURE: 110 MMHG | WEIGHT: 185 LBS | TEMPERATURE: 96 F | BODY MASS INDEX: 27.4 KG/M2 | OXYGEN SATURATION: 98 % | HEART RATE: 63 BPM

## 2021-12-20 DIAGNOSIS — R07.81 RIB PAIN ON LEFT SIDE: ICD-10-CM
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
## 2021-12-20 PROCEDURE — 99213 OFFICE O/P EST LOW 20 MIN: CPT | Performed by: FAMILY MEDICINE

## 2022-01-12 ENCOUNTER — IMMUNIZATIONS (OUTPATIENT)
Dept: FAMILY MEDICINE CLINIC | Facility: HOSPITAL | Age: 52
End: 2022-01-12

## 2022-01-12 DIAGNOSIS — Z23 ENCOUNTER FOR IMMUNIZATION: Primary | ICD-10-CM

## 2022-01-12 PROCEDURE — 0064A COVID-19 MODERNA VACC 0.25 ML BOOSTER: CPT

## 2022-01-12 PROCEDURE — 91306 COVID-19 MODERNA VACC 0.25 ML BOOSTER: CPT

## 2022-01-21 ENCOUNTER — OFFICE VISIT (OUTPATIENT)
Dept: OBGYN CLINIC | Facility: CLINIC | Age: 52
End: 2022-01-21
Payer: OTHER MISCELLANEOUS

## 2022-01-21 VITALS
HEART RATE: 85 BPM | BODY MASS INDEX: 27.25 KG/M2 | HEIGHT: 69 IN | WEIGHT: 184 LBS | SYSTOLIC BLOOD PRESSURE: 121 MMHG | DIASTOLIC BLOOD PRESSURE: 84 MMHG

## 2022-01-21 DIAGNOSIS — S89.92XD LEFT KNEE INJURY, SUBSEQUENT ENCOUNTER: ICD-10-CM

## 2022-01-21 DIAGNOSIS — R07.81 RIB PAIN ON LEFT SIDE: ICD-10-CM

## 2022-01-21 DIAGNOSIS — S80.01XD CONTUSION OF RIGHT KNEE, SUBSEQUENT ENCOUNTER: ICD-10-CM

## 2022-01-21 DIAGNOSIS — S83.511D SPRAIN OF ANTERIOR CRUCIATE LIGAMENT OF RIGHT KNEE, SUBSEQUENT ENCOUNTER: Primary | ICD-10-CM

## 2022-01-21 DIAGNOSIS — M25.361 PATELLAR INSTABILITY OF RIGHT KNEE: ICD-10-CM

## 2022-01-21 PROBLEM — S83.511A SPRAIN OF ANTERIOR CRUCIATE LIGAMENT OF RIGHT KNEE: Status: ACTIVE | Noted: 2022-01-21

## 2022-01-21 PROBLEM — S80.01XA CONTUSION OF RIGHT KNEE: Status: ACTIVE | Noted: 2022-01-21

## 2022-01-21 PROCEDURE — 99213 OFFICE O/P EST LOW 20 MIN: CPT | Performed by: FAMILY MEDICINE

## 2022-01-21 RX ORDER — TERBINAFINE HYDROCHLORIDE 250 MG/1
TABLET ORAL
COMMUNITY
Start: 2021-12-29

## 2022-01-21 NOTE — PROGRESS NOTES
Assessment/Plan:  Assessment/Plan   Diagnoses and all orders for this visit:    Sprain of anterior cruciate ligament of right knee, subsequent encounter  -     Ambulatory Referral to Physical Therapy; Future    Contusion of right knee, subsequent encounter  -     Ambulatory Referral to Physical Therapy; Future    Left knee injury, subsequent encounter  -     Ambulatory Referral to Physical Therapy; Future    Rib pain on left side  -     Ambulatory Referral to Physical Therapy; Future    Patellar instability of right knee  -     Brace    Other orders  -     terbinafine (LamISIL) 250 mg tablet         45year-old male with bilateral knee, right worse than left, and left-sided rib pain onset from injury while at work on 10/22/2021  Discussed with patient physical exam, impression and plan  Right knee noted for tenderness at the medial joint line and patellar facet  He has full extension and there is positive patellar inhibition and grind  Clinical impression is that he is improving, but not fully recovered from his injury, and abnormal patellofemoral mechanics have been affecting his function  I will provide with patellar stabilizing knee brace to be worn during physical activity  He is to continue with formal therapy and do home exercises directed  Patient was advised that as he continues to progress we will likely initiate work conditioning at next evaluation  He will follow up in 5 weeks at which point he will be re-evaluated  Subjective:   Patient ID: Aliyah Dwyer is a 45 y o  male  Chief Complaint   Patient presents with    Right Knee - Pain, Follow-up       78-year-old male following up for bilateral knee and left-sided rib pain onset from injury while at work on 10/22/2021  He was last seen by me 5 weeks ago which point he was advised continue with formal therapy and use of topical lidocaine and topical diclofenac    He states that rib pain has been improving gradually and states it is about 90% improved  Right knee pain has still been bothersome  He has pain described as localized to the anterior medial aspect knee, sharp, non radiating, worse with twisting, and improved with resting  He has been attending formal therapy and doing home exercises as directed  PT has been progressing and he has started using weights during sessions  He has been wearing a compression sleeve over the knee which he states help with pain and function, but with repetitive activity he experiences worsening pain and swelling  Knee Pain  This is a new problem  The current episode started more than 1 month ago  The problem occurs daily  The problem has been gradually improving  Associated symptoms include arthralgias  Pertinent negatives include no joint swelling, numbness or weakness  The symptoms are aggravated by bending and twisting  He has tried rest, position changes, NSAIDs and ice (Physical therapy, home exercise) for the symptoms  The treatment provided mild relief  Review of Systems   Musculoskeletal: Positive for arthralgias  Negative for joint swelling  Neurological: Negative for weakness and numbness  Objective:  Vitals:    01/21/22 1010   BP: 121/84   Pulse: 85   Weight: 83 5 kg (184 lb)   Height: 5' 9" (1 753 m)     Right Knee Exam     Tenderness   The patient is experiencing tenderness in the medial joint line (Patellar facet)  Range of Motion   Extension: normal     Other   Swelling: mild    Comments:  Positive patellar inhibition and grind            Physical Exam  Vitals and nursing note reviewed  Constitutional:       General: He is not in acute distress  Appearance: He is well-developed  He is not ill-appearing or diaphoretic  HENT:      Head: Normocephalic and atraumatic  Right Ear: External ear normal       Left Ear: External ear normal    Eyes:      Conjunctiva/sclera: Conjunctivae normal    Neck:      Trachea: No tracheal deviation     Cardiovascular:      Rate and Rhythm: Normal rate  Pulmonary:      Effort: Pulmonary effort is normal  No respiratory distress  Abdominal:      General: There is no distension  Musculoskeletal:         General: Tenderness present  Skin:     General: Skin is warm and dry  Coloration: Skin is not jaundiced or pale  Neurological:      Mental Status: He is alert and oriented to person, place, and time  Psychiatric:         Mood and Affect: Mood normal          Behavior: Behavior normal          Thought Content:  Thought content normal          Judgment: Judgment normal

## 2022-01-21 NOTE — LETTER
January 21, 2022     Patient: Max Mcpherson   YOB: 1970   Date of Visit: 1/21/2022       To Whom it May Concern:    Max Mcpherson is under my professional care  He was seen in my office on 1/21/2022  He is unable to return to work at this time      He is undergoing physical therapy      He will be re-evaluated in 5 weeks      If you have any questions or concerns, please don't hesitate to call           Sincerely,          [employer], DO        CC: No Recipients

## 2022-01-24 DIAGNOSIS — I10 ESSENTIAL (PRIMARY) HYPERTENSION: ICD-10-CM

## 2022-01-24 DIAGNOSIS — G44.52 NEW DAILY PERSISTENT HEADACHE (NDPH): ICD-10-CM

## 2022-01-24 RX ORDER — PROPRANOLOL HYDROCHLORIDE 40 MG/1
TABLET ORAL
Qty: 60 TABLET | Refills: 1 | Status: SHIPPED | OUTPATIENT
Start: 2022-01-24 | End: 2022-07-12 | Stop reason: SDUPTHER

## 2022-02-21 ENCOUNTER — OFFICE VISIT (OUTPATIENT)
Dept: FAMILY MEDICINE CLINIC | Facility: CLINIC | Age: 52
End: 2022-02-21
Payer: COMMERCIAL

## 2022-02-21 ENCOUNTER — APPOINTMENT (OUTPATIENT)
Dept: LAB | Facility: CLINIC | Age: 52
End: 2022-02-21
Payer: COMMERCIAL

## 2022-02-21 VITALS
HEART RATE: 65 BPM | HEIGHT: 69 IN | TEMPERATURE: 97.9 F | DIASTOLIC BLOOD PRESSURE: 76 MMHG | SYSTOLIC BLOOD PRESSURE: 120 MMHG | BODY MASS INDEX: 27.85 KG/M2 | OXYGEN SATURATION: 99 % | WEIGHT: 188 LBS

## 2022-02-21 DIAGNOSIS — R43.2 DYSGEUSIA: ICD-10-CM

## 2022-02-21 DIAGNOSIS — R43.2 DYSGEUSIA: Primary | ICD-10-CM

## 2022-02-21 DIAGNOSIS — R19.5 DARK STOOLS: ICD-10-CM

## 2022-02-21 DIAGNOSIS — Z13.220 NEED FOR LIPID SCREENING: ICD-10-CM

## 2022-02-21 DIAGNOSIS — Z13.1 SCREENING FOR DIABETES MELLITUS: ICD-10-CM
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
## 2022-02-21 PROBLEM — R07.81 RIB PAIN ON LEFT SIDE: Status: RESOLVED | Noted: 2021-10-29 | Resolved: 2022-02-21

## 2022-02-21 LAB
ALBUMIN SERPL BCP-MCNC: 3.9 G/DL (ref 3.5–5)
ALP SERPL-CCNC: 54 U/L (ref 46–116)
ALT SERPL W P-5'-P-CCNC: 27 U/L (ref 12–78)
ANION GAP SERPL CALCULATED.3IONS-SCNC: 5 MMOL/L (ref 4–13)
AST SERPL W P-5'-P-CCNC: 18 U/L (ref 5–45)
BILIRUB SERPL-MCNC: 0.58 MG/DL (ref 0.2–1)
BUN SERPL-MCNC: 14 MG/DL (ref 5–25)
CALCIUM SERPL-MCNC: 9.5 MG/DL (ref 8.3–10.1)
CHLORIDE SERPL-SCNC: 106 MMOL/L (ref 100–108)
CHOLEST SERPL-MCNC: 240 MG/DL
CO2 SERPL-SCNC: 29 MMOL/L (ref 21–32)
CREAT SERPL-MCNC: 0.96 MG/DL (ref 0.6–1.3)
GFR SERPL CREATININE-BSD FRML MDRD: 90 ML/MIN/1.73SQ M
GLUCOSE P FAST SERPL-MCNC: 107 MG/DL (ref 65–99)
HDLC SERPL-MCNC: 49 MG/DL
LDLC SERPL CALC-MCNC: 154 MG/DL (ref 0–100)
NONHDLC SERPL-MCNC: 191 MG/DL
POTASSIUM SERPL-SCNC: 4.6 MMOL/L (ref 3.5–5.3)
PROT SERPL-MCNC: 7.9 G/DL (ref 6.4–8.2)
SODIUM SERPL-SCNC: 140 MMOL/L (ref 136–145)
TRIGL SERPL-MCNC: 183 MG/DL
TSH SERPL DL<=0.05 MIU/L-ACNC: 2.68 UIU/ML (ref 0.36–3.74)
VIT B12 SERPL-MCNC: 520 PG/ML (ref 100–900)

## 2022-02-21 PROCEDURE — 84443 ASSAY THYROID STIM HORMONE: CPT

## 2022-02-21 PROCEDURE — 36415 COLL VENOUS BLD VENIPUNCTURE: CPT

## 2022-02-21 PROCEDURE — 80053 COMPREHEN METABOLIC PANEL: CPT

## 2022-02-21 PROCEDURE — 80061 LIPID PANEL: CPT

## 2022-02-21 PROCEDURE — 99214 OFFICE O/P EST MOD 30 MIN: CPT | Performed by: FAMILY MEDICINE

## 2022-02-21 PROCEDURE — 82607 VITAMIN B-12: CPT

## 2022-02-21 PROCEDURE — 86038 ANTINUCLEAR ANTIBODIES: CPT

## 2022-02-21 RX ORDER — PANTOPRAZOLE SODIUM 20 MG/1
20 TABLET, DELAYED RELEASE ORAL
Qty: 30 TABLET | Refills: 0 | Status: SHIPPED | OUTPATIENT
Start: 2022-02-21

## 2022-02-21 NOTE — PROGRESS NOTES
Assessment/Plan:    No problem-specific Assessment & Plan notes found for this encounter  Diagnoses and all orders for this visit:    Dysgeusia  -     pantoprazole (PROTONIX) 20 mg tablet; Take 1 tablet (20 mg total) by mouth daily before breakfast  -     Ambulatory referral to Gastroenterology; Future  -     JESS Screen w/ Reflex to Titer/Pattern; Future  -     TSH, 3rd generation with Free T4 reflex; Future  -     Vitamin B12; Future  -     H  pylori antigen, stool; Future    Need for lipid screening  -     Lipid panel; Future    Screening for diabetes mellitus  -     Comprehensive metabolic panel; Future    Dark stools  -     H  pylori antigen, stool; Future      Follow up with gastro after 1 month if symptoms continue    Subjective:      Patient ID: Roberto Dejesus is a 45 y o  male  Patient is here because he has been having abnormal metallic taste in his mouth for the past few days  He denies any acid reflux symptoms  Did have dark stools recently however that stopped  He was taking ibuprofen daily for month given knee pain and injury at work several months ago  Also he was taking ibuprofen with aspirin  Drinks alcohol socially and smokes cigarettes socially  The following portions of the patient's history were reviewed and updated as appropriate:   He  has no past medical history on file  He   Patient Active Problem List    Diagnosis Date Noted    Dysgeusia 02/21/2022    Dark stools 02/21/2022    Sprain of anterior cruciate ligament of right knee 01/21/2022    Contusion of right knee 01/21/2022    Acute pain of left knee 11/17/2021    Acute pain of right knee 11/05/2021    Abrasion of right leg 11/05/2021    Work related injury 10/29/2021    Trauma 10/22/2021    Fall 10/22/2021    Essential hypertension 10/07/2021    New daily persistent headache 10/07/2021    Health maintenance examination 08/17/2021     He  has no past surgical history on file    His family history is not on file   He  reports that he has been smoking  He has never used smokeless tobacco  He reports that he does not drink alcohol and does not use drugs  Current Outpatient Medications   Medication Sig Dispense Refill    aspirin (ECOTRIN LOW STRENGTH) 81 mg EC tablet Take 81 mg by mouth daily      aspirin-acetaminophen-caffeine (EXCEDRIN MIGRAINE) 250-250-65 MG per tablet Take 1 tablet by mouth every 6 (six) hours as needed for headaches 30 tablet 0    Diclofenac Sodium (VOLTAREN) 1 % Apply 2 g topically 4 (four) times a day 100 g 1    lidocaine (Lidoderm) 5 % Apply 1 patch topically daily Remove & Discard patch within 12 hours or as directed by MD 30 patch 0    lidocaine (LMX) 4 % cream Apply topically as needed for mild pain 120 g 2    propranolol (INDERAL) 40 mg tablet TAKE 1 TABLET BY MOUTH TWICE A DAY 60 tablet 1    terbinafine (LamISIL) 250 mg tablet       pantoprazole (PROTONIX) 20 mg tablet Take 1 tablet (20 mg total) by mouth daily before breakfast 30 tablet 0     No current facility-administered medications for this visit       Current Outpatient Medications on File Prior to Visit   Medication Sig    aspirin (ECOTRIN LOW STRENGTH) 81 mg EC tablet Take 81 mg by mouth daily    aspirin-acetaminophen-caffeine (EXCEDRIN MIGRAINE) 250-250-65 MG per tablet Take 1 tablet by mouth every 6 (six) hours as needed for headaches    Diclofenac Sodium (VOLTAREN) 1 % Apply 2 g topically 4 (four) times a day    lidocaine (Lidoderm) 5 % Apply 1 patch topically daily Remove & Discard patch within 12 hours or as directed by MD    lidocaine (LMX) 4 % cream Apply topically as needed for mild pain    propranolol (INDERAL) 40 mg tablet TAKE 1 TABLET BY MOUTH TWICE A DAY    terbinafine (LamISIL) 250 mg tablet     [DISCONTINUED] ibuprofen (MOTRIN) 800 mg tablet Take 1 tablet (800 mg total) by mouth every 8 (eight) hours as needed for moderate pain (Patient not taking: Reported on 2/21/2022 )     No current facility-administered medications on file prior to visit  He is allergic to ephedrine       Review of Systems   Constitutional: Negative for activity change, appetite change, fatigue and fever  HENT: Negative for congestion and ear discharge  Respiratory: Negative for cough and shortness of breath  Cardiovascular: Negative for chest pain and palpitations  Gastrointestinal: Negative for diarrhea and nausea  Musculoskeletal: Negative for arthralgias and back pain  Skin: Negative for color change and rash  Neurological: Negative for dizziness and headaches  Psychiatric/Behavioral: Negative for agitation and behavioral problems  Objective:      /76   Pulse 65   Temp 97 9 °F (36 6 °C)   Ht 5' 9" (1 753 m)   Wt 85 3 kg (188 lb)   SpO2 99%   BMI 27 76 kg/m²          Physical Exam  Constitutional:       General: He is not in acute distress  Appearance: He is well-developed  He is not diaphoretic  Eyes:      General: No scleral icterus  Pupils: Pupils are equal, round, and reactive to light  Cardiovascular:      Rate and Rhythm: Normal rate and regular rhythm  Heart sounds: Normal heart sounds  No murmur heard  Pulmonary:      Effort: Pulmonary effort is normal  No respiratory distress  Breath sounds: Normal breath sounds  No wheezing  Abdominal:      General: Bowel sounds are normal  There is no distension  Palpations: Abdomen is soft  Tenderness: There is no abdominal tenderness  Skin:     General: Skin is warm and dry  Findings: No rash  Neurological:      Mental Status: He is alert and oriented to person, place, and time

## 2022-02-22 ENCOUNTER — APPOINTMENT (OUTPATIENT)
Dept: LAB | Facility: CLINIC | Age: 52
End: 2022-02-22
Payer: COMMERCIAL

## 2022-02-22 DIAGNOSIS — R43.2 DYSGEUSIA: ICD-10-CM

## 2022-02-22 DIAGNOSIS — R19.5 DARK STOOLS: ICD-10-CM

## 2022-02-22 PROCEDURE — 87338 HPYLORI STOOL AG IA: CPT

## 2022-02-23 LAB
H PYLORI AG STL QL IA: NEGATIVE
RYE IGE QN: NEGATIVE

## 2022-02-25 ENCOUNTER — OFFICE VISIT (OUTPATIENT)
Dept: OBGYN CLINIC | Facility: CLINIC | Age: 52
End: 2022-02-25
Payer: OTHER MISCELLANEOUS

## 2022-02-25 VITALS
BODY MASS INDEX: 27.25 KG/M2 | HEART RATE: 70 BPM | HEIGHT: 69 IN | SYSTOLIC BLOOD PRESSURE: 129 MMHG | DIASTOLIC BLOOD PRESSURE: 84 MMHG | WEIGHT: 184 LBS

## 2022-02-25 DIAGNOSIS — S89.92XD INJURY OF LEFT KNEE, SUBSEQUENT ENCOUNTER: ICD-10-CM

## 2022-02-25 DIAGNOSIS — S83.511D SPRAIN OF ANTERIOR CRUCIATE LIGAMENT OF RIGHT KNEE, SUBSEQUENT ENCOUNTER: Primary | ICD-10-CM

## 2022-02-25 DIAGNOSIS — R07.81 RIB PAIN ON LEFT SIDE: ICD-10-CM

## 2022-02-25 DIAGNOSIS — S80.01XD CONTUSION OF RIGHT KNEE, SUBSEQUENT ENCOUNTER: ICD-10-CM

## 2022-02-25 DIAGNOSIS — M25.361 PATELLAR INSTABILITY OF RIGHT KNEE: ICD-10-CM

## 2022-02-25 PROCEDURE — 99213 OFFICE O/P EST LOW 20 MIN: CPT | Performed by: FAMILY MEDICINE

## 2022-02-25 NOTE — LETTER
February 25, 2022     Patient: Noé Zamudio   YOB: 1970   Date of Visit: 2/25/2022       To Whom it May Concern:    Noé Zamudio is under my professional care  He was seen in my office on 2/25/2022  He is unable to return to work at this time      He is undergoing physical therapy      He will be re-evaluated in 5 weeks    If you have any questions or concerns, please don't hesitate to call           Sincerely,          [employer], DO        CC: No Recipients

## 2022-02-25 NOTE — PROGRESS NOTES
Assessment/Plan:  Assessment/Plan   Diagnoses and all orders for this visit:    Sprain of anterior cruciate ligament of right knee, subsequent encounter  -     Ambulatory Referral to Physical Therapy; Future    Contusion of right knee, subsequent encounter  -     Ambulatory Referral to Physical Therapy; Future    Patellar instability of right knee  -     Ambulatory Referral to Physical Therapy; Future    Injury of left knee, subsequent encounter  -     Ambulatory Referral to Physical Therapy; Future    Rib pain on left side  -     Ambulatory Referral to Physical Therapy; Future        50-year-old male with bilateral knee pain, right worse than left, left-sided rib pain onset from injury while at work on 10/22/2021  Clinical impression is that he is improving from his injuries  At this time he is to start work conditioning at physical therapy with anticipation of being able to return to work in the next 4-5 weeks  He will follow up with me in 5 weeks at which point he will be re-evaluated  Subjective:   Patient ID: Memo Bates is a 45 y o  male  Chief Complaint   Patient presents with    Left Knee - Follow-up    Right Knee - Follow-up       50-year-old male following up for bilateral knee pain, right worse than left, left-sided rib pain onset from injury while at work on 10/22/2021  He was last seen by me 5 weeks ago which point he is advised to continue with formal therapy  He has been attending physical therapy and doing home exercises  He reports improvement in strength and stability in the knee  He still experiencing pain that is sometimes bothersome  Pain described as localized to the anterior medial aspect knee, sharp, brought on with twisting maneuvers and improved with resting  He does report that if he does not wear knee brace he experiences worsening achy and soreness of the knee  He has decreased his NSAID intake to 2 to 3 times a week  Knee Pain  This is a new problem   The current episode started more than 1 month ago  The problem occurs daily  The problem has been gradually improving  Associated symptoms include arthralgias  Pertinent negatives include no joint swelling, numbness or weakness  The symptoms are aggravated by twisting  He has tried rest and NSAIDs (Knee brace, physical therapy, home exercise) for the symptoms  The treatment provided moderate relief  Review of Systems   Musculoskeletal: Positive for arthralgias  Negative for joint swelling  Neurological: Negative for weakness and numbness  Objective:  Vitals:    02/25/22 0959   BP: 129/84   Pulse: 70   Weight: 83 5 kg (184 lb)   Height: 5' 9" (1 753 m)     Ortho Exam    Physical Exam  Vitals and nursing note reviewed  Constitutional:       General: He is not in acute distress  Appearance: He is well-developed  He is not ill-appearing or diaphoretic  HENT:      Head: Normocephalic and atraumatic  Right Ear: External ear normal       Left Ear: External ear normal    Eyes:      Conjunctiva/sclera: Conjunctivae normal    Neck:      Trachea: No tracheal deviation  Cardiovascular:      Rate and Rhythm: Normal rate  Pulmonary:      Effort: Pulmonary effort is normal  No respiratory distress  Abdominal:      General: There is no distension  Skin:     General: Skin is warm and dry  Coloration: Skin is not jaundiced or pale  Neurological:      Mental Status: He is alert and oriented to person, place, and time  Psychiatric:         Mood and Affect: Mood normal          Behavior: Behavior normal          Thought Content:  Thought content normal          Judgment: Judgment normal

## 2022-02-25 NOTE — LETTER
February 25, 2022     Patient: Yomi Arndt   YOB: 1970   Date of Visit: 2/25/2022       To Whom it May Concern:    Yomi Arndt is under my professional care  He was seen in my office on 2/25/2022  He is unable to return to work at this time      He is undergoing physical therapy      He will be re-evaluated in 5 weeks    If you have any questions or concerns, please don't hesitate to call           Sincerely,          [employer], DO        CC: No Recipients

## 2022-03-18 ENCOUNTER — OFFICE VISIT (OUTPATIENT)
Dept: GASTROENTEROLOGY | Facility: CLINIC | Age: 52
End: 2022-03-18
Payer: COMMERCIAL

## 2022-03-18 ENCOUNTER — TRANSCRIBE ORDERS (OUTPATIENT)
Dept: GASTROENTEROLOGY | Facility: CLINIC | Age: 52
End: 2022-03-18

## 2022-03-18 VITALS
HEIGHT: 69 IN | HEART RATE: 78 BPM | SYSTOLIC BLOOD PRESSURE: 120 MMHG | OXYGEN SATURATION: 96 % | BODY MASS INDEX: 27.4 KG/M2 | WEIGHT: 185 LBS | DIASTOLIC BLOOD PRESSURE: 80 MMHG

## 2022-03-18 DIAGNOSIS — R43.2 DYSGEUSIA: Primary | ICD-10-CM

## 2022-03-18 DIAGNOSIS — R43.2 DYSGEUSIA: ICD-10-CM

## 2022-03-18 DIAGNOSIS — Z11.59 SPECIAL SCREENING EXAMINATION FOR VIRAL DISEASE: ICD-10-CM

## 2022-03-18 DIAGNOSIS — K92.1 MELENA: Primary | ICD-10-CM

## 2022-03-18 PROCEDURE — 99244 OFF/OP CNSLTJ NEW/EST MOD 40: CPT | Performed by: INTERNAL MEDICINE

## 2022-03-18 NOTE — PROGRESS NOTES
[facility]    Dear Cesia Epperson,     I had the pleasure of seeing your patient Abi Sánchez in the office today and I thank you for this kind referral        Chief Complaint:  Black stool, change in taste      HPI:  Abi Sánchez is a 45 y o  male who presents with a recent injury at work  The patient began taking ibuprofen in January  Following this he had black stools  He was placed on pantoprazole  The black stools have cleared up  He has occasional abdominal discomfort mostly in left upper quadrant  The patient notes that he began having metallic taste before he was taking the pantoprazole  According to the patient he home tested for COVID on multiple occasions  His taste then went to very sour in nature  Now he has completely lost the sense of taste  Again he does not test positive for COVID but has not done an antibody test   He had no other symptomatology  He does not complain of any other issues such as sinus, headache, or any other problems  He has no black stool at this time but has not had an EGD  He has no weight loss  No fever or chills  No other associated symptomatology         Review of Systems:   Constitutional: No fever or chills, feels well, no tiredness, no recent weight gain or weight loss  HENT: No complaints of earache, no hearing loss, no nosebleeds, no nasal discharge, no sore throat, no hoarseness  Eyes: No complaints of eye pain, no red eyes, no discharge from eyes, no itchy eyes  Cardiovascular: No complaints of slow heart rate, no fast heart rate, no chest pain, no palpitations, no leg claudication, no lower extremity edema  Respiratory: No complaints of shortness of breath, no wheezing, no cough, no SOB on exertion, no orthopnea  Gastrointestinal: As noted in HPI  Genitourinary: No complaints of dysuria, no incontinence, no hesitancy, no nocturia     Musculoskeletal:  Positive arthralgia, no myalgias, no joint swelling or stiffness, no limb pain or swelling  Neurological: No complaints of headache, no confusion, no convulsions, no numbness or tingling, no dizziness or fainting, no limb weakness, no difficulty walking  Skin: No complaints of skin rash or skin lesions, no itching, no skin wound, no dry skin  Hematological/Lymphatic: No complaints of swollen glands, does not bleed easy  Allergic/Immunologic: No immunocompromised state  Endocrine:  No complaints of polyuria, no polydipsia  Psychiatric/Behavioral: is not suicidal, no sleep disturbances, no anxiety or depression, no change in personality, no emotional problems  Historical Information   Past Medical History:   Diagnosis Date    Hyperlipidemia     Hypertension      History reviewed  No pertinent surgical history  Social History   Social History     Substance and Sexual Activity   Alcohol Use Never     Social History     Substance and Sexual Activity   Drug Use Never     Social History     Tobacco Use   Smoking Status Current Every Day Smoker   Smokeless Tobacco Never Used     Family History   Problem Relation Age of Onset    Diabetes Mother     Hypertension Mother     Cancer Mother     Heart disease Father          Current Medications: has a current medication list which includes the following prescription(s): aspirin, aspirin-acetaminophen-caffeine, diclofenac sodium, lidocaine, lidocaine, pantoprazole, propranolol, and terbinafine  Vital Signs: /80   Pulse 78   Ht 5' 9" (1 753 m)   Wt 83 9 kg (185 lb)   SpO2 96%   BMI 27 32 kg/m²     Physical Exam:   Constitutional  General Appearance: No acute distress, well appearing and well nourished  Head  Normocephalic  Eyes  Conjunctivae and lids: No swelling, erythema, or discharge  Pupils and irises: Equal, round and reactive to light     Ears, Nose, Mouth, and Throat  External inspection of ears and nose: Normal  Nasal mucosa, septum and turbinates: Normal without edema or erythema/   Oropharynx: Normal with no erythema, edema, exudate or lesions  Neck  Normal range of motion  Neck supple  Cardiovascular  Auscultation of the heart: Normal rate and rhythm, normal S1 and S2 without murmurs  Examination of the extremities for edema and/or varicosities: Normal  Pulmonary/Chest  Respiratory effort: No increased work of breathing or signs of respiratory distress  Auscultation of lungs: Clear to auscultation, equal breath sounds bilaterally, no wheezes, rales, no rhonchi  Abdomen  Abdomen: Non-tender, no masses  Liver and spleen: No hepatomegaly or splenomegaly  Musculoskeletal  Gait and station: normal   Digits and Nails: normal without clubbing or cyanosis  Inspection/palpation of joints, bones, and muscles: Normal  Neurological  No nystagmus or asterixis  Skin  Skin and subcutaneous tissue: Normal without rashes or lesions  Lymphatic  Palpation of the lymph nodes in neck: No lymphadenopathy  Psychiatric  Orientation to person, place and time: Normal   Mood and affect: Normal          Labs:   Lab Results   Component Value Date    ALT 27 02/21/2022    AST 18 02/21/2022    BUN 14 02/21/2022    CALCIUM 9 5 02/21/2022     02/21/2022    CHOL 248 (H) 02/21/2017    CO2 29 02/21/2022    CREATININE 0 96 02/21/2022    HDL 49 02/21/2022    HCT 50 8 02/21/2017    HGB 17 4 02/21/2017     02/21/2017    K 4 6 02/21/2022    PSA 0 8 08/17/2021     02/21/2017    TRIG 183 (H) 02/21/2022    WBC 7 4 02/21/2017         X-Rays & Procedures:   No orders to display         ______________________________________________________________________      Assessment & Plan:      Diagnoses and all orders for this visit:    Melena  -     EGD; Future    Dysgeusia  -     Ambulatory referral to Gastroenterology        I have scheduled the patient for an EGD to investigate his melena  He is not taking his PPI present    Because of the progression from metallic taste sour taste to complete loss of the sense of taste, I have referred the patient to the [facility] at [address] [place] [address]  I will be happy to inform you of his EGD results and further recommendations  I would like to thank you for allowing me to participate in his care              With warmest regards,    Mauro Morrison MD, [facility]

## 2022-03-18 NOTE — LETTER
March 18, 2022     Zeke Talbot, [address]  [facility]  [address]    Patient: Aranza Gonzalez   YOB: 1970   Date of Visit: 3/18/2022       Dear Dr Christopher Dexter:    Thank you for referring Aranza Gonzalez to me for evaluation  Below are my notes for this consultation  If you have questions, please do not hesitate to call me  I look forward to following your patient along with you  Sincerely,        Divina Avila MD        CC: No Recipients  Divina Avila MD  3/18/2022 10:06 AM  Incomplete  [facility]    Dear Francine Atkinson,     I had the pleasure of seeing your patient Aranza Gonzalez in the office today and I thank you for this kind referral        Chief Complaint:  Black stool, change in taste      HPI:  Aranza Gonzalez is a 45 y o  male who presents with a recent injury at work  The patient began taking ibuprofen in January  Following this he had black stools  He was placed on pantoprazole  The black stools have cleared up  He has occasional abdominal discomfort mostly in left upper quadrant  The patient notes that he began having metallic taste before he was taking the pantoprazole  According to the patient he home tested for COVID on multiple occasions  His taste then went to very sour in nature  Now he has completely lost the sense of taste  Again he does not test positive for COVID but has not done an antibody test   He had no other symptomatology  He does not complain of any other issues such as sinus, headache, or any other problems  He has no black stool at this time but has not had an EGD  He has no weight loss  No fever or chills  No other associated symptomatology         Review of Systems:   Constitutional: No fever or chills, feels well, no tiredness, no recent weight gain or weight loss  HENT: No complaints of earache, no hearing loss, no nosebleeds, no nasal discharge, no sore throat, no hoarseness      Eyes: No complaints of eye pain, no red eyes, no discharge from eyes, no itchy eyes  Cardiovascular: No complaints of slow heart rate, no fast heart rate, no chest pain, no palpitations, no leg claudication, no lower extremity edema  Respiratory: No complaints of shortness of breath, no wheezing, no cough, no SOB on exertion, no orthopnea  Gastrointestinal: As noted in HPI  Genitourinary: No complaints of dysuria, no incontinence, no hesitancy, no nocturia  Musculoskeletal:  Positive arthralgia, no myalgias, no joint swelling or stiffness, no limb pain or swelling  Neurological: No complaints of headache, no confusion, no convulsions, no numbness or tingling, no dizziness or fainting, no limb weakness, no difficulty walking  Skin: No complaints of skin rash or skin lesions, no itching, no skin wound, no dry skin  Hematological/Lymphatic: No complaints of swollen glands, does not bleed easy  Allergic/Immunologic: No immunocompromised state  Endocrine:  No complaints of polyuria, no polydipsia  Psychiatric/Behavioral: is not suicidal, no sleep disturbances, no anxiety or depression, no change in personality, no emotional problems  Historical Information   Past Medical History:   Diagnosis Date    Hyperlipidemia     Hypertension      History reviewed  No pertinent surgical history  Social History   Social History     Substance and Sexual Activity   Alcohol Use Never     Social History     Substance and Sexual Activity   Drug Use Never     Social History     Tobacco Use   Smoking Status Current Every Day Smoker   Smokeless Tobacco Never Used     Family History   Problem Relation Age of Onset    Diabetes Mother     Hypertension Mother     Cancer Mother     Heart disease Father          Current Medications: has a current medication list which includes the following prescription(s): aspirin, aspirin-acetaminophen-caffeine, diclofenac sodium, lidocaine, lidocaine, pantoprazole, propranolol, and terbinafine  Vital Signs: /80   Pulse 78   Ht 5' 9" (1 753 m)   Wt 83 9 kg (185 lb)   SpO2 96%   BMI 27 32 kg/m²     Physical Exam:   Constitutional  General Appearance: No acute distress, well appearing and well nourished  Head  Normocephalic  Eyes  Conjunctivae and lids: No swelling, erythema, or discharge  Pupils and irises: Equal, round and reactive to light  Ears, Nose, Mouth, and Throat  External inspection of ears and nose: Normal  Nasal mucosa, septum and turbinates: Normal without edema or erythema/   Oropharynx: Normal with no erythema, edema, exudate or lesions  Neck  Normal range of motion  Neck supple  Cardiovascular  Auscultation of the heart: Normal rate and rhythm, normal S1 and S2 without murmurs  Examination of the extremities for edema and/or varicosities: Normal  Pulmonary/Chest  Respiratory effort: No increased work of breathing or signs of respiratory distress  Auscultation of lungs: Clear to auscultation, equal breath sounds bilaterally, no wheezes, rales, no rhonchi  Abdomen  Abdomen: Non-tender, no masses  Liver and spleen: No hepatomegaly or splenomegaly  Musculoskeletal  Gait and station: normal   Digits and Nails: normal without clubbing or cyanosis  Inspection/palpation of joints, bones, and muscles: Normal  Neurological  No nystagmus or asterixis  Skin  Skin and subcutaneous tissue: Normal without rashes or lesions  Lymphatic  Palpation of the lymph nodes in neck: No lymphadenopathy     Psychiatric  Orientation to person, place and time: Normal   Mood and affect: Normal          Labs:   Lab Results   Component Value Date    ALT 27 02/21/2022    AST 18 02/21/2022    BUN 14 02/21/2022    CALCIUM 9 5 02/21/2022     02/21/2022    CHOL 248 (H) 02/21/2017    CO2 29 02/21/2022    CREATININE 0 96 02/21/2022    HDL 49 02/21/2022    HCT 50 8 02/21/2017    HGB 17 4 02/21/2017     02/21/2017    K 4 6 02/21/2022    PSA 0 8 08/17/2021     02/21/2017 TRIG 183 (H) 02/21/2022    WBC 7 4 02/21/2017         X-Rays & Procedures:   No orders to display         ______________________________________________________________________      Assessment & Plan:      Diagnoses and all orders for this visit:    Melena  -     EGD; Future    Dysgeusia  -     Ambulatory referral to Gastroenterology        I have scheduled the patient for an EGD to investigate his melena  He is not taking his PPI present  Because of the progression from metallic taste sour taste to complete loss of the sense of taste, I have referred the patient to the [facility] at [address] [place] [address]  I will be happy to inform you of his EGD results and further recommendations  I would like to thank you for allowing me to participate in his care              With warmest regards,    Daniel Ohara MD, [facility]

## 2022-03-24 ENCOUNTER — TELEPHONE (OUTPATIENT)
Dept: GASTROENTEROLOGY | Facility: CLINIC | Age: 52
End: 2022-03-24

## 2022-03-24 PROCEDURE — U0003 INFECTIOUS AGENT DETECTION BY NUCLEIC ACID (DNA OR RNA); SEVERE ACUTE RESPIRATORY SYNDROME CORONAVIRUS 2 (SARS-COV-2) (CORONAVIRUS DISEASE [COVID-19]), AMPLIFIED PROBE TECHNIQUE, MAKING USE OF HIGH THROUGHPUT TECHNOLOGIES AS DESCRIBED BY CMS-2020-01-R: HCPCS | Performed by: INTERNAL MEDICINE

## 2022-03-24 PROCEDURE — U0005 INFEC AGEN DETEC AMPLI PROBE: HCPCS | Performed by: INTERNAL MEDICINE

## 2022-03-28 ENCOUNTER — TELEPHONE (OUTPATIENT)
Dept: GASTROENTEROLOGY | Facility: CLINIC | Age: 52
End: 2022-03-28

## 2022-03-28 ENCOUNTER — LAB REQUISITION (OUTPATIENT)
Dept: LAB | Facility: HOSPITAL | Age: 52
End: 2022-03-28
Payer: COMMERCIAL

## 2022-03-28 DIAGNOSIS — K92.1 MELENA: ICD-10-CM

## 2022-03-28 DIAGNOSIS — D13.30 BENIGN NEOPLASM OF UNSPECIFIED PART OF SMALL INTESTINE: ICD-10-CM

## 2022-03-28 PROCEDURE — 88305 TISSUE EXAM BY PATHOLOGIST: CPT | Performed by: PATHOLOGY

## 2022-03-31 ENCOUNTER — TELEPHONE (OUTPATIENT)
Dept: GASTROENTEROLOGY | Facility: CLINIC | Age: 52
End: 2022-03-31

## 2022-04-01 ENCOUNTER — TELEPHONE (OUTPATIENT)
Dept: OBGYN CLINIC | Facility: CLINIC | Age: 52
End: 2022-04-01

## 2022-04-01 ENCOUNTER — OFFICE VISIT (OUTPATIENT)
Dept: OBGYN CLINIC | Facility: CLINIC | Age: 52
End: 2022-04-01
Payer: OTHER MISCELLANEOUS

## 2022-04-01 VITALS
SYSTOLIC BLOOD PRESSURE: 122 MMHG | WEIGHT: 183 LBS | HEART RATE: 77 BPM | HEIGHT: 69 IN | DIASTOLIC BLOOD PRESSURE: 84 MMHG | BODY MASS INDEX: 27.11 KG/M2

## 2022-04-01 DIAGNOSIS — S89.92XD INJURY OF LEFT KNEE, SUBSEQUENT ENCOUNTER: ICD-10-CM

## 2022-04-01 DIAGNOSIS — R07.81 RIB PAIN ON LEFT SIDE: ICD-10-CM

## 2022-04-01 DIAGNOSIS — S80.01XD CONTUSION OF RIGHT KNEE, SUBSEQUENT ENCOUNTER: ICD-10-CM

## 2022-04-01 DIAGNOSIS — M25.361 PATELLAR INSTABILITY OF RIGHT KNEE: ICD-10-CM

## 2022-04-01 DIAGNOSIS — S83.511D SPRAIN OF ANTERIOR CRUCIATE LIGAMENT OF RIGHT KNEE, SUBSEQUENT ENCOUNTER: Primary | ICD-10-CM

## 2022-04-01 PROCEDURE — 99213 OFFICE O/P EST LOW 20 MIN: CPT | Performed by: FAMILY MEDICINE

## 2022-04-01 NOTE — PROGRESS NOTES
Assessment/Plan:  Assessment/Plan   Diagnoses and all orders for this visit:    Sprain of anterior cruciate ligament of right knee, subsequent encounter    Contusion of right knee, subsequent encounter    Patellar instability of right knee    Injury of left knee, subsequent encounter    Rib pain on left side      62-year-old male with onset of bilateral knee pain, right worse than left, left-sided rib pain from injury at work on 10/22/2021  Discussed with patient physical exam, impression and plan  He has full extension and flexion both knees  He has full strength extension and flexion both knees  He demonstrates squatting and duck walk with mild discomfort at the anterior aspect the right knee  Clinical impression is that he is improved regard to contusion of the knees and ACL sprain  His injury caused change in patellofemoral mechanics, from which he is still having some symptoms  Due to the nature of his work which involves a lot of bending, twisting, squatting, and straddling, he may experience episodic flare-ups of the patellofemoral pain  For this he is to continue with exercise program on a regular basis and may also wear knee brace as needed  There is no need for surgical invention at this time and we may defer injections  He will follow up as needed  Subjective:   Patient ID: Marco Stiles is a 45 y o  male  Chief Complaint   Patient presents with    Right Knee - Pain, Follow-up    Left Knee - Follow-up       62-year-old male following up for onset of bilateral knee pain, right worse than left, and left-sided rib pain from injury at work on 10/22/2021  He was last seen by me 5 weeks ago at which point he was referred to begin work conditioning  He reports feeling much better since last visit  He completed formal therapy and work conditioning program   He has noticed that he was able to complete most tasks  He reports experiencing some pain in the right knee    Pain described as localized to the anterior medial peripatellar aspect, achy and sore and sometimes sharp, worse with twisting particular maneuvers to involve valgus stress with pivoting/turning, and improved with resting  He denies any buckling or instability  He does report that if he continues to do his home exercising and stretching programs symptoms are very manageable  He has attempted taking long car rides about 1 hour each way and has had little to no discomfort doing so  Knee Pain  This is a new problem  The current episode started more than 1 month ago  The problem occurs intermittently  The problem has been gradually improving  Associated symptoms include arthralgias  Pertinent negatives include no joint swelling, numbness or weakness  The symptoms are aggravated by twisting  He has tried rest and NSAIDs (Knee brace, physical therapy home exercise) for the symptoms  The treatment provided significant relief  Review of Systems   Musculoskeletal: Positive for arthralgias  Negative for joint swelling  Neurological: Negative for weakness and numbness  Objective:  Vitals:    04/01/22 0949   BP: 122/84   Pulse: 77   Weight: 83 kg (183 lb)   Height: 5' 9" (1 753 m)     Right Knee Exam     Muscle Strength   The patient has normal right knee strength  Range of Motion   Extension: normal   Flexion: 130     Comments:  Mild pain with squatting and duck walk      Left Knee Exam     Range of Motion   Extension: normal   Flexion: 130     Comments:  No pain with squatting and duck walk            Physical Exam  Vitals and nursing note reviewed  Constitutional:       General: He is not in acute distress  Appearance: He is well-developed  He is not ill-appearing or diaphoretic  HENT:      Head: Normocephalic and atraumatic  Right Ear: External ear normal       Left Ear: External ear normal    Eyes:      Conjunctiva/sclera: Conjunctivae normal    Neck:      Trachea: No tracheal deviation  Cardiovascular:      Rate and Rhythm: Normal rate  Pulmonary:      Effort: Pulmonary effort is normal  No respiratory distress  Abdominal:      General: There is no distension  Skin:     General: Skin is warm and dry  Coloration: Skin is not jaundiced or pale  Neurological:      Mental Status: He is alert and oriented to person, place, and time  Psychiatric:         Mood and Affect: Mood normal          Behavior: Behavior normal          Thought Content:  Thought content normal          Judgment: Judgment normal

## 2022-04-01 NOTE — LETTER
April 1, 2022     Patient: Debbie Aldana   YOB: 1970   Date of Visit: 4/1/2022       To Whom it May Concern:    Debbie Aldana is under my professional care  He was seen in my office on 4/1/2022  He may return to work full duty as of 04/04/2022  If you have any questions or concerns, please don't hesitate to call           Sincerely,          [employer], DO        CC: No Recipients

## 2022-04-25 ENCOUNTER — TELEPHONE (OUTPATIENT)
Dept: GASTROENTEROLOGY | Facility: CLINIC | Age: 52
End: 2022-04-25

## 2022-04-25 NOTE — TELEPHONE ENCOUNTER
Patients GI provider:  Dr Patricia Ridley    Number to return call: ([phone])    Reason for call: Pt calling returning your call to schedule his EUS  Please call patient back  Thank you!     Scheduled procedure/appointment date if applicable: Apt/procedure

## 2022-04-26 ENCOUNTER — PREP FOR PROCEDURE (OUTPATIENT)
Dept: GASTROENTEROLOGY | Facility: CLINIC | Age: 52
End: 2022-04-26

## 2022-04-26 DIAGNOSIS — K31.89 GASTRIC NODULE: Primary | ICD-10-CM

## 2022-04-26 NOTE — TELEPHONE ENCOUNTER
Scheduled date of EUS(as of today): 6/6/22  Physician performing EUS: Dr Montiel  Location of EUS: [facility]  Instructions reviewed with patient by: Rebecca/michelle  Clearances:N/A

## 2022-06-06 ENCOUNTER — ANESTHESIA (OUTPATIENT)
Dept: GASTROENTEROLOGY | Facility: HOSPITAL | Age: 52
End: 2022-06-06

## 2022-06-06 ENCOUNTER — ANESTHESIA EVENT (OUTPATIENT)
Dept: GASTROENTEROLOGY | Facility: HOSPITAL | Age: 52
End: 2022-06-06

## 2022-06-06 ENCOUNTER — HOSPITAL ENCOUNTER (OUTPATIENT)
Dept: GASTROENTEROLOGY | Facility: HOSPITAL | Age: 52
Setting detail: OUTPATIENT SURGERY
Discharge: HOME | End: 2022-06-06
Attending: INTERNAL MEDICINE | Admitting: INTERNAL MEDICINE
Payer: COMMERCIAL

## 2022-06-06 VITALS
HEART RATE: 71 BPM | DIASTOLIC BLOOD PRESSURE: 57 MMHG | BODY MASS INDEX: 25.77 KG/M2 | SYSTOLIC BLOOD PRESSURE: 104 MMHG | HEIGHT: 70 IN | RESPIRATION RATE: 18 BRPM | TEMPERATURE: 97.3 F | WEIGHT: 180 LBS | OXYGEN SATURATION: 100 %

## 2022-06-06 DIAGNOSIS — K31.89 GASTRIC NODULE: ICD-10-CM

## 2022-06-06 PROBLEM — IMO0001 SMOKING: Chronic | Status: ACTIVE | Noted: 2022-06-06

## 2022-06-06 PROBLEM — F17.200 SMOKING: Chronic | Status: ACTIVE | Noted: 2022-06-06

## 2022-06-06 PROCEDURE — 88305 TISSUE EXAM BY PATHOLOGIST: CPT | Performed by: PATHOLOGY

## 2022-06-06 PROCEDURE — 88341 IMHCHEM/IMCYTCHM EA ADD ANTB: CPT | Performed by: PATHOLOGY

## 2022-06-06 PROCEDURE — 88342 IMHCHEM/IMCYTCHM 1ST ANTB: CPT | Performed by: PATHOLOGY

## 2022-06-06 PROCEDURE — 88313 SPECIAL STAINS GROUP 2: CPT | Performed by: PATHOLOGY

## 2022-06-06 PROCEDURE — 43254 EGD ENDO MUCOSAL RESECTION: CPT | Performed by: INTERNAL MEDICINE

## 2022-06-06 RX ORDER — PROPOFOL 10 MG/ML
INJECTION, EMULSION INTRAVENOUS CONTINUOUS PRN
Status: DISCONTINUED | OUTPATIENT
Start: 2022-06-06 | End: 2022-06-06

## 2022-06-06 RX ORDER — LIDOCAINE HYDROCHLORIDE 10 MG/ML
INJECTION, SOLUTION EPIDURAL; INFILTRATION; INTRACAUDAL; PERINEURAL AS NEEDED
Status: DISCONTINUED | OUTPATIENT
Start: 2022-06-06 | End: 2022-06-06

## 2022-06-06 RX ORDER — PROPOFOL 10 MG/ML
INJECTION, EMULSION INTRAVENOUS AS NEEDED
Status: DISCONTINUED | OUTPATIENT
Start: 2022-06-06 | End: 2022-06-06

## 2022-06-06 RX ORDER — ALBUTEROL SULFATE 90 UG/1
AEROSOL, METERED RESPIRATORY (INHALATION) AS NEEDED
Status: DISCONTINUED | OUTPATIENT
Start: 2022-06-06 | End: 2022-06-06

## 2022-06-06 RX ORDER — SODIUM CHLORIDE 9 MG/ML
INJECTION, SOLUTION INTRAVENOUS CONTINUOUS PRN
Status: DISCONTINUED | OUTPATIENT
Start: 2022-06-06 | End: 2022-06-06

## 2022-06-06 RX ADMIN — Medication 4 MG: at 13:12

## 2022-06-06 RX ADMIN — PROPOFOL 170 MCG/KG/MIN: 10 INJECTION, EMULSION INTRAVENOUS at 13:10

## 2022-06-06 RX ADMIN — PROPOFOL 40 MG: 10 INJECTION, EMULSION INTRAVENOUS at 13:18

## 2022-06-06 RX ADMIN — SODIUM CHLORIDE: 9 INJECTION, SOLUTION INTRAVENOUS at 13:04

## 2022-06-06 RX ADMIN — PROPOFOL 120 MG: 10 INJECTION, EMULSION INTRAVENOUS at 13:09

## 2022-06-06 RX ADMIN — ALBUTEROL SULFATE 2 PUFF: 90 AEROSOL, METERED RESPIRATORY (INHALATION) at 13:06

## 2022-06-06 RX ADMIN — LIDOCAINE HYDROCHLORIDE 90 MG: 10 INJECTION, SOLUTION EPIDURAL; INFILTRATION; INTRACAUDAL; PERINEURAL at 13:09

## 2022-06-06 NOTE — ANESTHESIA PREPROCEDURE EVALUATION
Procedure:  ENDOSCOPIC ULTRASOUND (UPPER)    Relevant Problems   CARDIO   (+) Essential hypertension      NEURO/PSYCH   (+) New daily persistent headache      PULMONARY   (+) Smoking      Other   (+) Dark stools   (+) Dysgeusia      Adequately NPO  No prior anesthesia complications  Smokes 1/2ppd for about 40 years, denies COPD  Physical Exam    Airway    Mallampati score: III  TM Distance: >3 FB  Neck ROM: full     Dental   No notable dental hx     Cardiovascular  Rhythm: regular, Rate: normal,     Pulmonary  Pulmonary exam normal     Other Findings        Anesthesia Plan  ASA Score- 2     Anesthesia Type- IV sedation with anesthesia with ASA Monitors  Additional Monitors:   Airway Plan:     Comment: Spontaneous with supplemental O2  Plan Factors-Exercise tolerance (METS): >4 METS  Chart reviewed  Existing labs reviewed  Patient summary reviewed  Patient is a current smoker  Obstructive sleep apnea risk education given perioperatively  Induction- intravenous  Postoperative Plan-     Informed Consent- Anesthetic plan and risks discussed with patient  I personally reviewed this patient with the CRNA  Discussed and agreed on the Anesthesia Plan with the BECKIE Damon

## 2022-06-06 NOTE — ANESTHESIA POSTPROCEDURE EVALUATION
Post-Op Assessment Note    CV Status:  Stable  Pain Score: 0    Pain management: adequate     Mental Status:  Sleepy   Hydration Status:  Stable   PONV Controlled:  None   Airway Patency:  Patent      Post Op Vitals Reviewed: Yes      Staff: CRNA         No complications documented      BP   103/55   Temp     Pulse   72   Resp   14   SpO2   97% on FM

## 2022-06-06 NOTE — H&P
History and Physical -  [facility]  Kallie Ware 45 y o  male MRN: [id number]    HPI: Kallie Ware is a 45y o  year old male who presents with gastric antral nodule  Review of Systems    Historical Information   Past Medical History:   Diagnosis Date    Hyperlipidemia     Hypertension      No past surgical history on file  Social History   Social History     Substance and Sexual Activity   Alcohol Use Never     Social History     Substance and Sexual Activity   Drug Use Never     Social History     Tobacco Use   Smoking Status Current Every Day Smoker   Smokeless Tobacco Never Used     Family History   Problem Relation Age of Onset    Diabetes Mother     Hypertension Mother     Cancer Mother     Heart disease Father        Meds/Allergies     (Not in a hospital admission)      Allergies   Allergen Reactions    Ephedrine Other (See Comments)     Face turns white       Objective     /87   Pulse 79   Temp (!) 96 4 °F (35 8 °C) (Tympanic)   Resp 16   Ht 5' 9 75" (1 772 m)   Wt 81 6 kg (180 lb)   SpO2 100%   BMI 26 01 kg/m²       PHYSICAL EXAM    Gen: NAD  CV: RRR  CHEST: Clear  ABD: soft, NT/ND  EXT: no edema  Neuro: AAO      ASSESSMENT/PLAN:  This is a 45y o  year old male here for EGD / eus for gastric antral nodule       PLAN:   Procedure: egd/eus

## 2022-06-07 ENCOUNTER — TELEPHONE (OUTPATIENT)
Dept: GASTROENTEROLOGY | Facility: CLINIC | Age: 52
End: 2022-06-07

## 2022-06-07 DIAGNOSIS — R10.10 PAIN OF UPPER ABDOMEN: Primary | ICD-10-CM

## 2022-06-07 RX ORDER — OMEPRAZOLE 40 MG/1
40 CAPSULE, DELAYED RELEASE ORAL DAILY
Qty: 30 CAPSULE | Refills: 1 | Status: SHIPPED | OUTPATIENT
Start: 2022-06-07 | End: 2022-07-07

## 2022-06-07 RX ORDER — SUCRALFATE 1 G/1
1 TABLET ORAL 4 TIMES DAILY
Qty: 28 TABLET | Refills: 0 | Status: SHIPPED | OUTPATIENT
Start: 2022-06-07 | End: 2022-06-14

## 2022-06-07 NOTE — TELEPHONE ENCOUNTER
Spoke with patient  History of melena, s/p EUS 6/6/2022  Patient c/o mid upper abdominal sharp cramping intermittent after his EUS 3/10 pain  Eating accentuates pain  Positional adjustments alleviates his pain  He is not on any GI medication at this time  Denies nausea, vomiting, fever, chills, SOB, weakness, black or bloody stools  Passed a normal formed BM last night  Patient will try gas-x OTC PRN, and small frequent meals, encourage fluids  Patient does not want to go to ED  Any other suggestions?

## 2022-06-07 NOTE — PROGRESS NOTES
Patient called with pain  Cramping  No tenderness  No bloating/ distention  No fevers or chills  Start omeprazole 40 mg daily and carafate 4 times daily for a week

## 2022-06-07 NOTE — TELEPHONE ENCOUNTER
ptmarleni had an EUS with Dr Mirtha Mcconnell on 6/6/22 and since then he has been having sharp consistent ABD pain that has been even waking him out of his sleep   Please advise

## 2022-06-08 NOTE — TELEPHONE ENCOUNTER
Patient called back wishing to speak with Dr Vik Herrera, states that he had a procedure on Monday and is still having ongoing issues, he is c/o sharp pains, states that he does not believe he should have this issue as it has been 48 hrs since the procedure, he is asking for a call back from clinical and/or Dr Vik Herrera  Please advise pt

## 2022-06-09 DIAGNOSIS — R10.10 PAIN OF UPPER ABDOMEN: Primary | ICD-10-CM

## 2022-06-20 NOTE — RESULT ENCOUNTER NOTE
Please schedule the patient for repeat endoscopy in 3 months  Inform patient via Kiw[employer]  Please review the pathology/lab result of further discussion  Copied from Magnetic message :       Ericka Khanna,     Your biopsies from the stomach showed inflammation with precancerous cells  This area was successfully removed  I hope your abdominal pain which were experiencing after the procedure has also improved  I would recommend repeating the endoscopy in 3 months to further evaluate these areas      Best regards,     Aroldo Oliva MD

## 2022-07-12 DIAGNOSIS — I10 ESSENTIAL (PRIMARY) HYPERTENSION: ICD-10-CM

## 2022-07-12 DIAGNOSIS — G44.52 NEW DAILY PERSISTENT HEADACHE (NDPH): ICD-10-CM

## 2022-07-13 RX ORDER — PROPRANOLOL HYDROCHLORIDE 40 MG/1
40 TABLET ORAL 2 TIMES DAILY
Qty: 60 TABLET | Refills: 0 | Status: SHIPPED | OUTPATIENT
Start: 2022-07-13 | End: 2022-10-11 | Stop reason: SDUPTHER

## 2022-07-26 ENCOUNTER — TELEPHONE (OUTPATIENT)
Dept: GASTROENTEROLOGY | Facility: CLINIC | Age: 52
End: 2022-07-26

## 2022-07-26 NOTE — TELEPHONE ENCOUNTER
Patient called to speak to Dr Lali Shaver about his results of the Endoscopic Ultrasonography   Please call Patricia Martínez at [phone]

## 2022-07-29 ENCOUNTER — TELEPHONE (OUTPATIENT)
Dept: GASTROENTEROLOGY | Facility: CLINIC | Age: 52
End: 2022-07-29

## 2022-09-02 DIAGNOSIS — Z23 NEED FOR SHINGLES VACCINE: Primary | ICD-10-CM

## 2022-09-02 RX ORDER — ZOSTER VACCINE RECOMBINANT, ADJUVANTED 50 MCG/0.5
0.5 KIT INTRAMUSCULAR ONCE
Qty: 1 EACH | Refills: 1 | Status: SHIPPED | OUTPATIENT
Start: 2022-09-02 | End: 2022-09-02

## 2022-10-11 DIAGNOSIS — I10 ESSENTIAL (PRIMARY) HYPERTENSION: ICD-10-CM

## 2022-10-11 DIAGNOSIS — G44.52 NEW DAILY PERSISTENT HEADACHE (NDPH): ICD-10-CM

## 2022-10-12 PROBLEM — Z00.00 HEALTH MAINTENANCE EXAMINATION: Status: RESOLVED | Noted: 2021-08-17 | Resolved: 2022-10-12

## 2022-10-12 RX ORDER — PROPRANOLOL HYDROCHLORIDE 40 MG/1
40 TABLET ORAL 2 TIMES DAILY
Qty: 60 TABLET | Refills: 0 | Status: SHIPPED | OUTPATIENT
Start: 2022-10-12

## 2022-12-08 DIAGNOSIS — I10 ESSENTIAL (PRIMARY) HYPERTENSION: ICD-10-CM

## 2022-12-08 DIAGNOSIS — G44.52 NEW DAILY PERSISTENT HEADACHE (NDPH): ICD-10-CM

## 2022-12-08 RX ORDER — PROPRANOLOL HYDROCHLORIDE 40 MG/1
40 TABLET ORAL 2 TIMES DAILY
Qty: 60 TABLET | Refills: 0 | Status: SHIPPED | OUTPATIENT
Start: 2022-12-08

## 2022-12-30 ENCOUNTER — OFFICE VISIT (OUTPATIENT)
Dept: FAMILY MEDICINE CLINIC | Facility: CLINIC | Age: 52
End: 2022-12-30

## 2022-12-30 VITALS
RESPIRATION RATE: 18 BRPM | HEIGHT: 70 IN | DIASTOLIC BLOOD PRESSURE: 84 MMHG | SYSTOLIC BLOOD PRESSURE: 138 MMHG | HEART RATE: 81 BPM | WEIGHT: 187 LBS | OXYGEN SATURATION: 98 % | BODY MASS INDEX: 26.77 KG/M2 | TEMPERATURE: 97.1 F

## 2022-12-30 DIAGNOSIS — Z00.00 HEALTH MAINTENANCE EXAMINATION: Primary | ICD-10-CM

## 2022-12-30 DIAGNOSIS — G44.52 NEW DAILY PERSISTENT HEADACHE (NDPH): ICD-10-CM

## 2022-12-30 DIAGNOSIS — Z12.5 SCREENING FOR PROSTATE CANCER: ICD-10-CM

## 2022-12-30 DIAGNOSIS — I10 ESSENTIAL (PRIMARY) HYPERTENSION: ICD-10-CM

## 2022-12-30 DIAGNOSIS — Z13.220 NEED FOR LIPID SCREENING: ICD-10-CM

## 2022-12-30 DIAGNOSIS — M25.50 ARTHRALGIA, UNSPECIFIED JOINT: ICD-10-CM

## 2022-12-30 DIAGNOSIS — F17.210 CIGARETTE NICOTINE DEPENDENCE WITHOUT COMPLICATION: ICD-10-CM

## 2022-12-30 DIAGNOSIS — Z13.1 SCREENING FOR DIABETES MELLITUS: ICD-10-CM

## 2022-12-30 PROBLEM — S80.811A ABRASION OF RIGHT LEG: Status: RESOLVED | Noted: 2021-11-05 | Resolved: 2022-12-30

## 2022-12-30 PROBLEM — M25.561 ACUTE PAIN OF RIGHT KNEE: Status: RESOLVED | Noted: 2021-11-05 | Resolved: 2022-12-30

## 2022-12-30 PROBLEM — M25.562 ACUTE PAIN OF LEFT KNEE: Status: RESOLVED | Noted: 2021-11-17 | Resolved: 2022-12-30

## 2022-12-30 PROBLEM — T14.90XA TRAUMA: Status: RESOLVED | Noted: 2021-10-22 | Resolved: 2022-12-30

## 2022-12-30 PROBLEM — S80.01XA CONTUSION OF RIGHT KNEE: Status: RESOLVED | Noted: 2022-01-21 | Resolved: 2022-12-30
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
## 2022-12-30 PROBLEM — R19.5 DARK STOOLS: Status: RESOLVED | Noted: 2022-02-21 | Resolved: 2022-12-30

## 2022-12-30 RX ORDER — PROPRANOLOL HYDROCHLORIDE 40 MG/1
40 TABLET ORAL 2 TIMES DAILY
Qty: 180 TABLET | Refills: 3 | Status: SHIPPED | OUTPATIENT
Start: 2022-12-30 | End: 2023-01-03 | Stop reason: SDUPTHER

## 2022-12-30 NOTE — PROGRESS NOTES
Name: Dani Larose      : 1970      MRN: [id number]  Encounter Provider: Jo-Ann Marie MD  Encounter Date: 2022   Encounter department: [address] [place] [address]     1  Health maintenance examination  Normal exam    2  Essential (primary) hypertension  -     propranolol (INDERAL) 40 mg tablet; Take 1 tablet (40 mg total) by mouth 2 (two) times a day    3  New daily persistent headache (ndph)  -     propranolol (INDERAL) 40 mg tablet; Take 1 tablet (40 mg total) by mouth 2 (two) times a day    4  Need for lipid screening  -     Lipid panel; Future    5  Screening for diabetes mellitus  -     Comprehensive metabolic panel; Future    6  Screening for prostate cancer  -     PSA, Total Screen; Future    7  Arthralgia, unspecified joint  -     Antinuclear Antibodies (JESS), IFA; Future  -     Rheumatoid Arthritis Factor; Future  -     Lyme Antibody Profile with reflex to WB; Future    8  Cigarette nicotine dependence without complication  -     CT lung screening program; Future; Expected date: 2022    F/U in 1 year       Subjective     Patient is here for a yearly physical   He has a Hx of HTN and Headaches takes propranolol daily currently 40 mg once daily which help his symptoms  Also has been having arthralgia multiple joints  current every day smoker  Review of Systems   Constitutional: Negative for activity change, appetite change, fatigue and fever  HENT: Negative for congestion and ear discharge  Respiratory: Negative for cough and shortness of breath  Cardiovascular: Negative for chest pain and palpitations  Gastrointestinal: Negative for diarrhea and nausea  Musculoskeletal: Positive for arthralgias  Negative for back pain  Skin: Negative for color change and rash  Neurological: Negative for dizziness and headaches  Psychiatric/Behavioral: Negative for agitation and behavioral problems         Past Medical History:   Diagnosis Date   • Hyperlipidemia    • Hypertension      History reviewed  No pertinent surgical history    Family History   Problem Relation Age of Onset   • Diabetes Mother    • Hypertension Mother    • Cancer Mother    • Heart disease Father      Social History     Socioeconomic History   • Marital status: /Civil Union     Spouse name: None   • Number of children: None   • Years of education: None   • Highest education level: None   Occupational History   • None   Tobacco Use   • Smoking status: Every Day     Packs/day: 0 50     Years: 30 00     Pack years: 15 00     Types: Cigarettes   • Smokeless tobacco: Never   Vaping Use   • Vaping Use: Never used   Substance and Sexual Activity   • Alcohol use: Never   • Drug use: Never   • Sexual activity: None   Other Topics Concern   • None   Social History Narrative   • None     Social Determinants of Health     Financial Resource Strain: Not on file   Food Insecurity: Not on file   Transportation Needs: Not on file   Physical Activity: Not on file   Stress: Not on file   Social Connections: Not on file   Intimate Partner Violence: Not on file   Housing Stability: Not on file     Current Outpatient Medications on File Prior to Visit   Medication Sig   • aspirin (ECOTRIN LOW STRENGTH) 81 mg EC tablet Take 81 mg by mouth daily   • [DISCONTINUED] propranolol (INDERAL) 40 mg tablet Take 1 tablet (40 mg total) by mouth 2 (two) times a day (Patient taking differently: Take 40 mg by mouth in the morning)   • omeprazole (PriLOSEC) 40 MG capsule Take 1 capsule (40 mg total) by mouth daily   • sucralfate (CARAFATE) 1 g tablet Take 1 tablet (1 g total) by mouth 4 (four) times a day for 7 days   • [DISCONTINUED] aspirin-acetaminophen-caffeine (EXCEDRIN MIGRAINE) 250-250-65 MG per tablet Take 1 tablet by mouth every 6 (six) hours as needed for headaches (Patient not taking: Reported on 12/30/2022)   • [DISCONTINUED] Diclofenac Sodium (VOLTAREN) 1 % Apply 2 g topically 4 (four) times a day (Patient not taking: Reported on 12/30/2022)   • [DISCONTINUED] lidocaine (Lidoderm) 5 % Apply 1 patch topically daily Remove & Discard patch within 12 hours or as directed by MD (Patient not taking: Reported on 12/30/2022)   • [DISCONTINUED] lidocaine (LMX) 4 % cream Apply topically as needed for mild pain (Patient not taking: Reported on 12/30/2022)   • [DISCONTINUED] pantoprazole (PROTONIX) 20 mg tablet Take 1 tablet (20 mg total) by mouth daily before breakfast (Patient not taking: Reported on 12/30/2022)   • [DISCONTINUED] terbinafine (LamISIL) 250 mg tablet  (Patient not taking: Reported on 12/30/2022)     Allergies   Allergen Reactions   • Ephedrine Other (See Comments)     Face turns white     Immunization History   Administered Date(s) Administered   • COVID-19 MODERNA VACC 0 25 ML IM BOOSTER 01/12/2022   • COVID-19 MODERNA VACC 0 5 ML IM 04/02/2021, 04/30/2021   • Td (adult), adsorbed 08/21/2008   • Tdap 09/29/2021       Objective     /84 (BP Location: Left arm, Cuff Size: Standard)   Pulse 81   Temp (!) 97 1 °F (36 2 °C)   Resp 18   Ht 5' 9 75" (1 772 m)   Wt 84 8 kg (187 lb)   SpO2 98%   BMI 27 02 kg/m²     Physical Exam  Constitutional:       General: He is not in acute distress  Appearance: He is well-developed  He is not diaphoretic  Eyes:      General: No scleral icterus  Pupils: Pupils are equal, round, and reactive to light  Cardiovascular:      Rate and Rhythm: Normal rate and regular rhythm  Heart sounds: Normal heart sounds  No murmur heard  Pulmonary:      Effort: Pulmonary effort is normal  No respiratory distress  Breath sounds: Normal breath sounds  No wheezing  Abdominal:      General: Bowel sounds are normal  There is no distension  Palpations: Abdomen is soft  Tenderness: There is no abdominal tenderness  Skin:     General: Skin is warm and dry  Findings: No rash     Neurological:      Mental Status: He is alert and oriented to person, place, and time         Yuki Fontenot MD

## 2023-01-03 DIAGNOSIS — G44.52 NEW DAILY PERSISTENT HEADACHE (NDPH): ICD-10-CM

## 2023-01-03 DIAGNOSIS — I10 ESSENTIAL (PRIMARY) HYPERTENSION: ICD-10-CM

## 2023-01-03 RX ORDER — PROPRANOLOL HYDROCHLORIDE 40 MG/1
40 TABLET ORAL 2 TIMES DAILY
Qty: 180 TABLET | Refills: 3 | Status: SHIPPED | OUTPATIENT
Start: 2023-01-03 | End: 2023-04-03

## 2023-01-05 ENCOUNTER — TELEPHONE (OUTPATIENT)
Dept: FAMILY MEDICINE CLINIC | Facility: CLINIC | Age: 53
End: 2023-01-05

## 2023-01-05 DIAGNOSIS — R68.82 LOW LIBIDO: Primary | ICD-10-CM

## 2023-01-10 ENCOUNTER — APPOINTMENT (OUTPATIENT)
Dept: LAB | Facility: CLINIC | Age: 53
End: 2023-01-10

## 2023-01-10 DIAGNOSIS — Z13.1 SCREENING FOR DIABETES MELLITUS: ICD-10-CM

## 2023-01-10 DIAGNOSIS — M25.50 ARTHRALGIA, UNSPECIFIED JOINT: ICD-10-CM

## 2023-01-10 DIAGNOSIS — Z12.5 SCREENING FOR PROSTATE CANCER: ICD-10-CM

## 2023-01-10 DIAGNOSIS — Z13.220 NEED FOR LIPID SCREENING: ICD-10-CM

## 2023-01-10 DIAGNOSIS — R68.82 LOW LIBIDO: ICD-10-CM

## 2023-01-10 LAB
ALBUMIN SERPL BCP-MCNC: 4.3 G/DL (ref 3.5–5)
ALP SERPL-CCNC: 47 U/L (ref 34–104)
ALT SERPL W P-5'-P-CCNC: 22 U/L (ref 7–52)
ANION GAP SERPL CALCULATED.3IONS-SCNC: 5 MMOL/L (ref 4–13)
AST SERPL W P-5'-P-CCNC: 22 U/L (ref 13–39)
B BURGDOR IGG+IGM SER-ACNC: 0.4 AI
BILIRUB SERPL-MCNC: 0.56 MG/DL (ref 0.2–1)
BUN SERPL-MCNC: 12 MG/DL (ref 5–25)
CALCIUM SERPL-MCNC: 9.3 MG/DL (ref 8.4–10.2)
CHLORIDE SERPL-SCNC: 106 MMOL/L (ref 96–108)
CHOLEST SERPL-MCNC: 224 MG/DL
CO2 SERPL-SCNC: 28 MMOL/L (ref 21–32)
CREAT SERPL-MCNC: 0.87 MG/DL (ref 0.6–1.3)
GFR SERPL CREATININE-BSD FRML MDRD: 98 ML/MIN/1.73SQ M
GLUCOSE P FAST SERPL-MCNC: 120 MG/DL (ref 65–99)
HDLC SERPL-MCNC: 52 MG/DL
LDLC SERPL CALC-MCNC: 130 MG/DL (ref 0–100)
NONHDLC SERPL-MCNC: 172 MG/DL
POTASSIUM SERPL-SCNC: 4 MMOL/L (ref 3.5–5.3)
PROT SERPL-MCNC: 7.3 G/DL (ref 6.4–8.4)
PSA SERPL-MCNC: 0.7 NG/ML (ref 0–4)
RHEUMATOID FACT SER QL LA: NEGATIVE
SODIUM SERPL-SCNC: 139 MMOL/L (ref 135–147)
TRIGL SERPL-MCNC: 210 MG/DL

## 2023-01-11 LAB — ANA TITR SER IF: NEGATIVE {TITER}

## 2023-01-12 LAB
TESTOST FREE SERPL-MCNC: 21.8 PG/ML (ref 7.2–24)
TESTOST SERPL-MCNC: 558 NG/DL (ref 264–916)

## 2023-02-09 ENCOUNTER — OFFICE VISIT (OUTPATIENT)
Dept: GASTROENTEROLOGY | Facility: CLINIC | Age: 53
End: 2023-02-09

## 2023-02-09 VITALS
DIASTOLIC BLOOD PRESSURE: 80 MMHG | WEIGHT: 185 LBS | HEART RATE: 77 BPM | OXYGEN SATURATION: 98 % | SYSTOLIC BLOOD PRESSURE: 142 MMHG | BODY MASS INDEX: 27.4 KG/M2 | HEIGHT: 69 IN

## 2023-02-09 DIAGNOSIS — R19.7 DIARRHEA, UNSPECIFIED TYPE: ICD-10-CM

## 2023-02-09 DIAGNOSIS — K92.1 MELENA: ICD-10-CM

## 2023-02-09 DIAGNOSIS — R19.4 CHANGE IN BOWEL HABITS: Primary | ICD-10-CM

## 2023-02-09 DIAGNOSIS — R15.2 FECAL URGENCY: ICD-10-CM

## 2023-02-09 NOTE — PROGRESS NOTES
[facility]      Chief Complaint: Diarrhea    HPI:  Dufm Schlatter is a 47 y o   male who presents with 2 separate GI issues  First is a history of a gastric nodule which was referred for endoscopic ultrasound removal   June 2022 the nodule was removed and improved to have precancerous cells  Patient was recommended for a follow-up EGD in 3 months but has not had it yet  He has seen melena on several occasions  Some weeks ago developed severe fecal urgency diarrhea  This is slightly better but still going on  He has no abdominal pain  He had occasional bright red blood per rectum  He also complains of intermittent very black stool  Mild nausea  No weight loss  No fever or chills  Patient's last colonoscopy was in 2015  He has had no change in his diet  No change in medications  No other contributory factor  Patient notes that he has been taking the bus to work in the yard for 20 years and over the last 3 or 4 weeks has had to stop multiple times  He actually had to have a bowel movement in the woods at 1 point  This is a distinct change  He has no other associated symptomatology  No nocturnal symptomatology  He does continue to smoke  He has no chest pain or shortness of         Review of Systems:   Constitutional: No fever or chills, feels well, no tiredness, no recent weight gain or weight loss  HENT: No complaints of earache, no hearing loss, no nosebleeds, no nasal discharge, no sore throat, no hoarseness  Eyes: No complaints of eye pain, no red eyes, no discharge from eyes, no itchy eyes  Cardiovascular: No complaints of slow heart rate, no fast heart rate, no chest pain, no palpitations, no leg claudication, no lower extremity edema  Respiratory: No complaints of shortness of breath, no wheezing, no cough, no SOB on exertion, no orthopnea     Gastrointestinal: As noted in HPI  Genitourinary: No complaints of dysuria, no incontinence, no hesitancy, no nocturia  Musculoskeletal: No complaints of arthralgia, no myalgias, no joint swelling or stiffness, no limb pain or swelling  Neurological: No complaints of headache, no confusion, no convulsions, no numbness or tingling, no dizziness or fainting, no limb weakness, no difficulty walking  Skin: No complaints of skin rash or skin lesions, no itching, no skin wound, no dry skin  Hematological/Lymphatic: No complaints of swollen glands, does not bleed easy  Allergic/Immunologic: No immunocompromised state  Endocrine:  No complaints of polyuria, no polydipsia  Psychiatric/Behavioral: is not suicidal, no sleep disturbances, no anxiety or depression, no change in personality, no emotional problems  Historical Information   Past Medical History:   Diagnosis Date   • Hyperlipidemia    • Hypertension      History reviewed  No pertinent surgical history  Social History   Social History     Substance and Sexual Activity   Alcohol Use Never     Social History     Substance and Sexual Activity   Drug Use Never     Social History     Tobacco Use   Smoking Status Every Day   • Packs/day: 0 50   • Years: 30 00   • Pack years: 15 00   • Types: Cigarettes   Smokeless Tobacco Never     Family History   Problem Relation Age of Onset   • Diabetes Mother    • Hypertension Mother    • Cancer Mother    • Heart disease Father          Current Medications: has a current medication list which includes the following prescription(s): aspirin, propranolol, omeprazole, and sucralfate  Vital Signs: /80   Pulse 77   Ht 5' 9" (1 753 m)   Wt 83 9 kg (185 lb)   SpO2 98%   BMI 27 32 kg/m²       Physical Exam:   Constitutional  General Appearance: No acute distress, well appearing and well nourished  Head  Normocephalic  Eyes  Conjunctivae and lids: No swelling, erythema, or discharge  Pupils and irises: Equal, round and reactive to light     Ears, Nose, Mouth, and Throat  External inspection of ears and nose: Normal  Nasal mucosa, septum and turbinates: Normal without edema or erythema/   Oropharynx: Normal with no erythema, edema, exudate or lesions  Neck  Normal range of motion  Neck supple  Cardiovascular  Auscultation of the heart: Normal rate and rhythm, normal S1 and S2 without murmurs  Examination of the extremities for edema and/or varicosities: Normal  Pulmonary/Chest  Respiratory effort: No increased work of breathing or signs of respiratory distress  Auscultation of lungs: Clear to auscultation, equal breath sounds bilaterally, no wheezes, rales, no rhonchi  Abdomen  Abdomen: Non-tender, no masses  Liver and spleen: No hepatomegaly or splenomegaly  Musculoskeletal  Gait and station: normal   Digits and Nails: normal without clubbing or cyanosis  Inspection/palpation of joints, bones, and muscles: Normal  Neurological  No nystagmus or asterixis  Skin  Skin and subcutaneous tissue: Normal without rashes or lesions  Lymphatic  Palpation of the lymph nodes in neck: No lymphadenopathy  Psychiatric  Orientation to person, place and time: Normal   Mood and affect: Normal          Labs:  Lab Results   Component Value Date    ALT 22 01/10/2023    AST 22 01/10/2023    BUN 12 01/10/2023    CALCIUM 9 3 01/10/2023     01/10/2023    CHOL 248 (H) 02/21/2017    CO2 28 01/10/2023    CREATININE 0 87 01/10/2023    HDL 52 01/10/2023    HCT 50 8 02/21/2017    HGB 17 4 02/21/2017     02/21/2017    K 4 0 01/10/2023    PSA 0 7 01/10/2023     02/21/2017    TRIG 210 (H) 01/10/2023    WBC 7 4 02/21/2017         X-Rays & Procedures:   No orders to display           ______________________________________________________________________      Assessment & Plan:     Diagnoses and all orders for this visit:    Change in bowel habits  -     Colonoscopy; Future    Diarrhea, unspecified type  -     Celiac Disease Antibody Profile; Future  -     Colonoscopy; Future  -     EGD;  Future    Fecal urgency  - Celiac Disease Antibody Profile; Future  -     Colonoscopy; Future    Melena  -     EGD; Future      Precancerous cells on gastric EUS biopsy    Patient will undergo both EGD and colonoscopy  He will undergo a celiac panel  Further recommendations will be pending on study results

## 2023-02-09 NOTE — LETTER
February 9, 2023     Francisco Vance, [address]  [facility]  [place] [address]    Patient: Dia Gyale   YOB: 1970   Date of Visit: 2/9/2023       Dear Dr Magy Whitmore:    Thank you for referring Dia Gayle to me for evaluation  Below are my notes for this consultation  If you have questions, please do not hesitate to call me  I look forward to following your patient along with you  Sincerely,        Carmine Atwood MD        CC: No Recipients  Carmine tAwood MD  2/9/2023  3:30 PM  Incomplete  [facility]      Chief Complaint: Diarrhea    HPI:  Dia Gayle is a 47 y o   male who presents with 2 separate GI issues  First is a history of a gastric nodule which was referred for endoscopic ultrasound removal   June 2022 the nodule was removed and improved to have precancerous cells  Patient was recommended for a follow-up EGD in 3 months but has not had it yet  He has seen melena on several occasions  Some weeks ago developed severe fecal urgency diarrhea  This is slightly better but still going on  He has no abdominal pain  He had occasional bright red blood per rectum  He also complains of intermittent very black stool  Mild nausea  No weight loss  No fever or chills  Patient's last colonoscopy was in 2015  He has had no change in his diet  No change in medications  No other contributory factor  Patient notes that he has been taking the bus to work in the yard for 20 years and over the last 3 or 4 weeks has had to stop multiple times  He actually had to have a bowel movement in the woods at 1 point  This is a distinct change  He has no other associated symptomatology  No nocturnal symptomatology  He does continue to smoke  He has no chest pain or shortness of         Review of Systems:   Constitutional: No fever or chills, feels well, no tiredness, no recent weight gain or weight loss     HENT: No complaints of earache, no hearing loss, no nosebleeds, no nasal discharge, no sore throat, no hoarseness  Eyes: No complaints of eye pain, no red eyes, no discharge from eyes, no itchy eyes  Cardiovascular: No complaints of slow heart rate, no fast heart rate, no chest pain, no palpitations, no leg claudication, no lower extremity edema  Respiratory: No complaints of shortness of breath, no wheezing, no cough, no SOB on exertion, no orthopnea  Gastrointestinal: As noted in HPI  Genitourinary: No complaints of dysuria, no incontinence, no hesitancy, no nocturia  Musculoskeletal: No complaints of arthralgia, no myalgias, no joint swelling or stiffness, no limb pain or swelling  Neurological: No complaints of headache, no confusion, no convulsions, no numbness or tingling, no dizziness or fainting, no limb weakness, no difficulty walking  Skin: No complaints of skin rash or skin lesions, no itching, no skin wound, no dry skin  Hematological/Lymphatic: No complaints of swollen glands, does not bleed easy  Allergic/Immunologic: No immunocompromised state  Endocrine:  No complaints of polyuria, no polydipsia  Psychiatric/Behavioral: is not suicidal, no sleep disturbances, no anxiety or depression, no change in personality, no emotional problems  Historical Information   Past Medical History:   Diagnosis Date   • Hyperlipidemia    • Hypertension      History reviewed  No pertinent surgical history    Social History   Social History     Substance and Sexual Activity   Alcohol Use Never     Social History     Substance and Sexual Activity   Drug Use Never     Social History     Tobacco Use   Smoking Status Every Day   • Packs/day: 0 50   • Years: 30 00   • Pack years: 15 00   • Types: Cigarettes   Smokeless Tobacco Never     Family History   Problem Relation Age of Onset   • Diabetes Mother    • Hypertension Mother    • Cancer Mother    • Heart disease Father          Current Medications: has a current medication list which includes the following prescription(s): aspirin, propranolol, omeprazole, and sucralfate  Vital Signs: /80   Pulse 77   Ht 5' 9" (1 753 m)   Wt 83 9 kg (185 lb)   SpO2 98%   BMI 27 32 kg/m²       Physical Exam:   Constitutional  General Appearance: No acute distress, well appearing and well nourished  Head  Normocephalic  Eyes  Conjunctivae and lids: No swelling, erythema, or discharge  Pupils and irises: Equal, round and reactive to light  Ears, Nose, Mouth, and Throat  External inspection of ears and nose: Normal  Nasal mucosa, septum and turbinates: Normal without edema or erythema/   Oropharynx: Normal with no erythema, edema, exudate or lesions  Neck  Normal range of motion  Neck supple  Cardiovascular  Auscultation of the heart: Normal rate and rhythm, normal S1 and S2 without murmurs  Examination of the extremities for edema and/or varicosities: Normal  Pulmonary/Chest  Respiratory effort: No increased work of breathing or signs of respiratory distress  Auscultation of lungs: Clear to auscultation, equal breath sounds bilaterally, no wheezes, rales, no rhonchi  Abdomen  Abdomen: Non-tender, no masses  Liver and spleen: No hepatomegaly or splenomegaly  Musculoskeletal  Gait and station: normal   Digits and Nails: normal without clubbing or cyanosis  Inspection/palpation of joints, bones, and muscles: Normal  Neurological  No nystagmus or asterixis  Skin  Skin and subcutaneous tissue: Normal without rashes or lesions  Lymphatic  Palpation of the lymph nodes in neck: No lymphadenopathy     Psychiatric  Orientation to person, place and time: Normal   Mood and affect: Normal          Labs:  Lab Results   Component Value Date    ALT 22 01/10/2023    AST 22 01/10/2023    BUN 12 01/10/2023    CALCIUM 9 3 01/10/2023     01/10/2023    CHOL 248 (H) 02/21/2017    CO2 28 01/10/2023    CREATININE 0 87 01/10/2023    HDL 52 01/10/2023    HCT 50 8 02/21/2017    HGB 17 4 02/21/2017  02/21/2017    K 4 0 01/10/2023    PSA 0 7 01/10/2023     02/21/2017    TRIG 210 (H) 01/10/2023    WBC 7 4 02/21/2017         X-Rays & Procedures:   No orders to display           ______________________________________________________________________      Assessment & Plan:     Diagnoses and all orders for this visit:    Change in bowel habits  -     Colonoscopy; Future    Diarrhea, unspecified type  -     Celiac Disease Antibody Profile; Future  -     Colonoscopy; Future  -     EGD; Future    Fecal urgency  -     Celiac Disease Antibody Profile; Future  -     Colonoscopy; Future    Melena  -     EGD; Future      Precancerous cells on gastric EUS biopsy    Patient will undergo both EGD and colonoscopy  He will undergo a celiac panel  Further recommendations will be pending on study results

## 2023-02-09 NOTE — PATIENT INSTRUCTIONS
Scheduled date of EGD/colonoscopy (as of today): 4/24/23  Physician performing EGD/colonoscopy: Wesly Jensen  Location of EGD/colonoscopy: [facility]  Desired bowel prep reviewed with patient: Loyda/Neftaly  Instructions reviewed with patient by: Ludmila GARCIA  Clearances:

## 2023-02-13 ENCOUNTER — APPOINTMENT (OUTPATIENT)
Dept: LAB | Facility: CLINIC | Age: 53
End: 2023-02-13

## 2023-02-13 DIAGNOSIS — R19.7 DIARRHEA, UNSPECIFIED TYPE: ICD-10-CM

## 2023-02-13 DIAGNOSIS — R15.2 FECAL URGENCY: ICD-10-CM

## 2023-02-14 LAB
ENDOMYSIUM IGA SER QL: NEGATIVE
GLIADIN PEPTIDE IGA SER-ACNC: 5 UNITS (ref 0–19)
GLIADIN PEPTIDE IGG SER-ACNC: 3 UNITS (ref 0–19)
IGA SERPL-MCNC: 178 MG/DL (ref 90–386)
TTG IGA SER-ACNC: <2 U/ML (ref 0–3)
TTG IGG SER-ACNC: <2 U/ML (ref 0–5)

## 2023-02-28 PROBLEM — Z00.00 HEALTH MAINTENANCE EXAMINATION: Status: RESOLVED | Noted: 2021-08-17 | Resolved: 2023-02-28

## 2023-03-31 ENCOUNTER — OFFICE VISIT (OUTPATIENT)
Dept: URGENT CARE | Facility: CLINIC | Age: 53
End: 2023-03-31

## 2023-03-31 VITALS
OXYGEN SATURATION: 96 % | TEMPERATURE: 98.6 F | HEART RATE: 76 BPM | RESPIRATION RATE: 18 BRPM | DIASTOLIC BLOOD PRESSURE: 92 MMHG | SYSTOLIC BLOOD PRESSURE: 124 MMHG

## 2023-03-31 DIAGNOSIS — R05.1 ACUTE COUGH: Primary | ICD-10-CM

## 2023-03-31 LAB
SARS-COV-2 AG UPPER RESP QL IA: NEGATIVE
VALID CONTROL: NORMAL

## 2023-03-31 RX ORDER — BENZONATATE 200 MG/1
200 CAPSULE ORAL 3 TIMES DAILY PRN
Qty: 20 CAPSULE | Refills: 0 | Status: SHIPPED | OUTPATIENT
Start: 2023-03-31

## 2023-03-31 NOTE — PROGRESS NOTES
[address] [facility]        NAME: Sammy Gonzalez is a 47 y o  male  : 1970    MRN: [id number]  DATE: 2023  TIME: 1:21 PM    Assessment and Plan   Acute cough [R05 1]  1  Acute cough  benzonatate (TESSALON) 200 MG capsule        Symptoms consistent with viral illness  Mild wheeze but cleared with cough  Follow up with primary care in 3-5 days  Go to ER if symptoms get worse  Patient Instructions     Start on cough medication for symptoms  Symptoms appear viral in nature  Follow up if not improving after 1 week, fevers, or shortness of breath  Follow up with PCP in 3-5 days  Proceed to ER if symptoms worsen  Chief Complaint     Chief Complaint   Patient presents with   • Cough     Started yesterday  Wife is also sick since last week  States when he has coughing fits he can not breath  History of Present Illness       Presents with cough that began yesterday  Cough is frequent, with coughing fits  With the fits he does get short of breath, not at rest  Known sick contacts includes wife with similar symptoms  Denies fevers or chills  Review of Systems   Review of Systems   Constitutional: Negative for chills and fever  HENT: Negative for ear pain and sore throat  Respiratory: Positive for cough  Negative for shortness of breath  Cardiovascular: Negative for chest pain and palpitations  Gastrointestinal: Negative for diarrhea, nausea and vomiting  Musculoskeletal: Negative for myalgias  Skin: Negative for color change and rash  Psychiatric/Behavioral: Negative for confusion  All other systems reviewed and are negative          Current Medications       Current Outpatient Medications:   •  aspirin (ECOTRIN LOW STRENGTH) 81 mg EC tablet, Take 81 mg by mouth daily, Disp: , Rfl:   •  benzonatate (TESSALON) 200 MG capsule, Take 1 capsule (200 mg total) by mouth 3 (three) times a day as needed for cough, Disp: 20 capsule, Rfl: 0  •  propranolol (INDERAL) 40 mg tablet, Take 1 tablet (40 mg total) by mouth 2 (two) times a day, Disp: 180 tablet, Rfl: 3  •  omeprazole (PriLOSEC) 40 MG capsule, Take 1 capsule (40 mg total) by mouth daily (Patient not taking: Reported on 3/31/2023), Disp: 30 capsule, Rfl: 1  •  sucralfate (CARAFATE) 1 g tablet, Take 1 tablet (1 g total) by mouth 4 (four) times a day for 7 days, Disp: 28 tablet, Rfl: 0    Current Allergies     Allergies as of 03/31/2023 - Reviewed 03/31/2023   Allergen Reaction Noted   • Ephedrine Other (See Comments) 08/17/2021            The following portions of the patient's history were reviewed and updated as appropriate: allergies, current medications, past family history, past medical history, past social history, past surgical history and problem list      Past Medical History:   Diagnosis Date   • Hyperlipidemia    • Hypertension        History reviewed  No pertinent surgical history  Family History   Problem Relation Age of Onset   • Diabetes Mother    • Hypertension Mother    • Cancer Mother    • Heart disease Father          Medications have been verified  Objective   /92 (BP Location: Right arm, Patient Position: Sitting, Cuff Size: Standard)   Pulse 76   Temp 98 6 °F (37 °C) (Temporal)   Resp 18   SpO2 96%        Physical Exam     Physical Exam  Vitals reviewed  Constitutional:       General: He is not in acute distress  Appearance: Normal appearance  HENT:      Right Ear: Tympanic membrane, ear canal and external ear normal       Left Ear: Tympanic membrane, ear canal and external ear normal       Nose: Nose normal       Mouth/Throat:      Mouth: Mucous membranes are moist       Pharynx: No posterior oropharyngeal erythema  Eyes:      Conjunctiva/sclera: Conjunctivae normal    Cardiovascular:      Rate and Rhythm: Normal rate and regular rhythm  Pulses: Normal pulses  Heart sounds: Normal heart sounds  No murmur heard    Pulmonary:      Effort: Pulmonary effort is normal  No respiratory distress  Breath sounds: Wheezing present  Skin:     General: Skin is warm and dry  Neurological:      General: No focal deficit present  Mental Status: He is alert and oriented to person, place, and time     Psychiatric:         Mood and Affect: Mood normal          Behavior: Behavior normal

## 2023-03-31 NOTE — PATIENT INSTRUCTIONS
Start on cough medication for symptoms  Symptoms appear viral in nature  Follow up if not improving after 1 week, fevers, or shortness of breath

## 2023-04-13 DIAGNOSIS — R05.9 COUGH, UNSPECIFIED TYPE: Primary | ICD-10-CM

## 2023-04-13 RX ORDER — HYDROCODONE BITARTRATE AND HOMATROPINE METHYLBROMIDE 5; 1.5 MG/1; MG/1
1 TABLET ORAL 3 TIMES DAILY PRN
Qty: 30 TABLET | Refills: 0 | Status: SHIPPED | OUTPATIENT
Start: 2023-04-13

## 2023-04-24 ENCOUNTER — HOSPITAL ENCOUNTER (OUTPATIENT)
Dept: GASTROENTEROLOGY | Facility: HOSPITAL | Age: 53
Setting detail: OUTPATIENT SURGERY
Discharge: HOME | End: 2023-04-24
Attending: INTERNAL MEDICINE

## 2023-04-24 ENCOUNTER — ANESTHESIA (OUTPATIENT)
Dept: GASTROENTEROLOGY | Facility: HOSPITAL | Age: 53
End: 2023-04-24

## 2023-04-24 ENCOUNTER — ANESTHESIA EVENT (OUTPATIENT)
Dept: GASTROENTEROLOGY | Facility: HOSPITAL | Age: 53
End: 2023-04-24

## 2023-04-24 VITALS
HEART RATE: 82 BPM | TEMPERATURE: 97.8 F | OXYGEN SATURATION: 100 % | RESPIRATION RATE: 17 BRPM | SYSTOLIC BLOOD PRESSURE: 130 MMHG | DIASTOLIC BLOOD PRESSURE: 82 MMHG | WEIGHT: 179.68 LBS | HEIGHT: 69 IN | BODY MASS INDEX: 26.61 KG/M2

## 2023-04-24 DIAGNOSIS — K92.1 MELENA: ICD-10-CM

## 2023-04-24 DIAGNOSIS — R19.7 DIARRHEA, UNSPECIFIED TYPE: ICD-10-CM

## 2023-04-24 DIAGNOSIS — R19.4 CHANGE IN BOWEL HABITS: ICD-10-CM

## 2023-04-24 DIAGNOSIS — R15.2 FECAL URGENCY: ICD-10-CM

## 2023-04-24 RX ORDER — PROPOFOL 10 MG/ML
INJECTION, EMULSION INTRAVENOUS AS NEEDED
Status: DISCONTINUED | OUTPATIENT
Start: 2023-04-24 | End: 2023-04-24

## 2023-04-24 RX ORDER — PROPOFOL 10 MG/ML
INJECTION, EMULSION INTRAVENOUS CONTINUOUS PRN
Status: DISCONTINUED | OUTPATIENT
Start: 2023-04-24 | End: 2023-04-24

## 2023-04-24 RX ORDER — SODIUM CHLORIDE, SODIUM LACTATE, POTASSIUM CHLORIDE, CALCIUM CHLORIDE 600; 310; 30; 20 MG/100ML; MG/100ML; MG/100ML; MG/100ML
INJECTION, SOLUTION INTRAVENOUS CONTINUOUS PRN
Status: DISCONTINUED | OUTPATIENT
Start: 2023-04-24 | End: 2023-04-24

## 2023-04-24 RX ORDER — LIDOCAINE HYDROCHLORIDE 20 MG/ML
INJECTION, SOLUTION EPIDURAL; INFILTRATION; INTRACAUDAL; PERINEURAL AS NEEDED
Status: DISCONTINUED | OUTPATIENT
Start: 2023-04-24 | End: 2023-04-24

## 2023-04-24 RX ADMIN — LIDOCAINE HYDROCHLORIDE 100 MG: 20 INJECTION, SOLUTION EPIDURAL; INFILTRATION; INTRACAUDAL at 11:31

## 2023-04-24 RX ADMIN — PROPOFOL 120 MCG/KG/MIN: 10 INJECTION, EMULSION INTRAVENOUS at 11:40

## 2023-04-24 RX ADMIN — SODIUM CHLORIDE, SODIUM LACTATE, POTASSIUM CHLORIDE, AND CALCIUM CHLORIDE: .6; .31; .03; .02 INJECTION, SOLUTION INTRAVENOUS at 11:28

## 2023-04-24 RX ADMIN — PROPOFOL 150 MG: 10 INJECTION, EMULSION INTRAVENOUS at 11:31

## 2023-04-24 RX ADMIN — PROPOFOL 50 MG: 10 INJECTION, EMULSION INTRAVENOUS at 11:36

## 2023-04-24 RX ADMIN — PROPOFOL 50 MG: 10 INJECTION, EMULSION INTRAVENOUS at 11:39

## 2023-04-24 RX ADMIN — PROPOFOL 50 MG: 10 INJECTION, EMULSION INTRAVENOUS at 11:33

## 2023-04-24 NOTE — ANESTHESIA PREPROCEDURE EVALUATION
Procedure:  COLONOSCOPY  EGD    Relevant Problems   ANESTHESIA (within normal limits)      CARDIO   (+) Essential hypertension      /RENAL  NPO confirmed  BMI 37 3      HEMATOLOGY (within normal limits)      NEURO/PSYCH   (+) New daily persistent headache (ndph)      PULMONARY   (+) Smoking   (-) URI (upper respiratory infection)   **reports pinched lip during last EGD    Allergies   Allergen Reactions   • Ephedrine Other (See Comments)     Face turns white     Current Outpatient Medications   Medication Instructions   • aspirin (ECOTRIN LOW STRENGTH) 81 mg, Oral, Daily   • benzonatate (TESSALON) 200 mg, Oral, 3 times daily PRN   • HYDROcodone Bit-Homatrop MBr 5-1 5 MG TABS 1 tablet, Oral, 3 times daily PRN   • omeprazole (PRILOSEC) 40 mg, Oral, Daily   • propranolol (INDERAL) 40 mg, Oral, 2 times daily   • sucralfate (CARAFATE) 1 g, Oral, 4 times daily     Lab Results   Component Value Date    WBC 7 4 02/21/2017    HGB 17 4 02/21/2017    HCT 50 8 02/21/2017     02/21/2017    SODIUM 139 01/10/2023    K 4 0 01/10/2023     01/10/2023    CO2 28 01/10/2023    BUN 12 01/10/2023    CREATININE 0 87 01/10/2023    AST 22 01/10/2023    ALT 22 01/10/2023    ALKPHOS 47 01/10/2023    TBILI 0 56 01/10/2023    ALB 4 3 01/10/2023     Vitals:    04/24/23 1037   BP: 132/83   Pulse: 89   Resp: 20   Temp: 98 6 °F (37 °C)   SpO2: 98%       Physical Exam    Airway    Mallampati score: III  TM Distance: >3 FB  Neck ROM: full     Dental   No notable dental hx     Cardiovascular  Rhythm: regular, Rate: normal,     Pulmonary  Pulmonary exam normal     Other Findings        Anesthesia Plan  ASA Score- 2     Anesthesia Type- IV sedation with anesthesia with ASA Monitors  Additional Monitors:   Airway Plan:     Comment: O2 mask, natural airway, EtCO2 monitor  Plan Factors-Exercise tolerance (METS): >4 METS  Chart reviewed  Existing labs reviewed  Patient summary reviewed  Patient is a current smoker  Obstructive sleep apnea risk education given perioperatively  Induction- intravenous  Postoperative Plan-     Informed Consent- Anesthetic plan and risks discussed with patient  I personally reviewed this patient with the CRNA  Discussed and agreed on the Anesthesia Plan with the CRNA  Shaila Serrano

## 2023-04-24 NOTE — ANESTHESIA POSTPROCEDURE EVALUATION
Post-Op Assessment Note    CV Status:  Stable  Pain Score: 0    Pain management: adequate     Mental Status:  Awake and sleepy   Hydration Status:  Euvolemic   PONV Controlled:  Controlled   Airway Patency:  Patent and adequate      Post Op Vitals Reviewed: Yes      Staff: CRNA         No notable events documented      BP   109/71   Temp      Pulse  78   Resp   16   SpO2 98

## 2023-04-24 NOTE — H&P
"History and Physical -  [facility]  Jayson Anand 47 y o  male MRN: [id number]                  HPI: Jayson Anand is a 47y o  year old male who presents for EGD and colonoscopy for history of gastric nodule, melena, diarrhea, fecal urgency, rectal bleeding  Last colonoscopy 8 years ago      REVIEW OF SYSTEMS: Per the HPI, and otherwise unremarkable  Historical Information   Past Medical History:   Diagnosis Date   • Hyperlipidemia    • Hypertension      No past surgical history on file  Social History   Social History     Substance and Sexual Activity   Alcohol Use Yes   • Alcohol/week: 6 0 standard drinks   • Types: 6 Cans of beer per week     Social History     Substance and Sexual Activity   Drug Use Never     Social History     Tobacco Use   Smoking Status Every Day   • Packs/day: 0 50   • Years: 30 00   • Pack years: 15 00   • Types: Cigarettes   Smokeless Tobacco Never     Family History   Problem Relation Age of Onset   • Diabetes Mother    • Hypertension Mother    • Cancer Mother    • Heart disease Father        Meds/Allergies     (Not in a hospital admission)      Allergies   Allergen Reactions   • Ephedrine Other (See Comments)     Face turns white       Objective     Blood pressure 132/83, pulse 89, temperature 98 6 °F (37 °C), temperature source Temporal, resp  rate 20, height 5' 9\" (1 753 m), weight 81 5 kg (179 lb 10 8 oz), SpO2 98 %        PHYSICAL EXAM    Gen: NAD  CV: RRR  CHEST: Clear  ABD: soft, NT/ND  EXT: no edema  Neuro: AAO      ASSESSMENT/PLAN:  This is a 47y o  year old male here for history of gastric nodule, melena, diarrhea, rectal bleeding, fecal urgency    PLAN:   Procedure: EGD and colonoscopy        " Sherlyn Lagos, ADS

## 2023-07-24 ENCOUNTER — TELEPHONE (OUTPATIENT)
Dept: GASTROENTEROLOGY | Facility: CLINIC | Age: 53
End: 2023-07-24

## 2023-07-24 DIAGNOSIS — R19.7 DIARRHEA, UNSPECIFIED TYPE: Primary | ICD-10-CM

## 2023-07-24 DIAGNOSIS — R15.2 FECAL URGENCY: ICD-10-CM

## 2023-07-24 RX ORDER — DICYCLOMINE HCL 20 MG
20 TABLET ORAL EVERY 6 HOURS
Qty: 120 TABLET | Refills: 1 | Status: SHIPPED | OUTPATIENT
Start: 2023-07-24

## 2023-07-24 NOTE — TELEPHONE ENCOUNTER
Pt. Came into office had some concerns, his symptoms are still there urgency and sever diarrhea, wants to know what the next step will be.

## 2023-11-24 ENCOUNTER — OFFICE VISIT (OUTPATIENT)
Dept: OBGYN CLINIC | Facility: CLINIC | Age: 53
End: 2023-11-24
Payer: OTHER MISCELLANEOUS

## 2023-11-24 VITALS
WEIGHT: 181.6 LBS | HEIGHT: 69 IN | DIASTOLIC BLOOD PRESSURE: 82 MMHG | SYSTOLIC BLOOD PRESSURE: 132 MMHG | BODY MASS INDEX: 26.9 KG/M2 | HEART RATE: 85 BPM

## 2023-11-24 DIAGNOSIS — S80.01XD CONTUSION OF RIGHT KNEE, SUBSEQUENT ENCOUNTER: ICD-10-CM

## 2023-11-24 DIAGNOSIS — S89.92XD INJURY OF LEFT KNEE, SUBSEQUENT ENCOUNTER: ICD-10-CM

## 2023-11-24 DIAGNOSIS — S83.511D SPRAIN OF ANTERIOR CRUCIATE LIGAMENT OF RIGHT KNEE, SUBSEQUENT ENCOUNTER: Primary | ICD-10-CM

## 2023-11-24 DIAGNOSIS — M25.361 PATELLAR INSTABILITY OF RIGHT KNEE: ICD-10-CM

## 2023-11-24 DIAGNOSIS — M17.10 PATELLOFEMORAL ARTHRITIS: ICD-10-CM

## 2023-11-24 PROCEDURE — 99213 OFFICE O/P EST LOW 20 MIN: CPT | Performed by: FAMILY MEDICINE

## 2023-11-24 NOTE — PROGRESS NOTES
Assessment/Plan:  Assessment/Plan   Diagnoses and all orders for this visit:    Sprain of anterior cruciate ligament of right knee, subsequent encounter    Contusion of right knee, subsequent encounter    Patellar instability of right knee    Injury of left knee, subsequent encounter    Patellofemoral arthritis          62-year-old male with onset of bilateral knee pain and left-sided rib pain from injury at work on 10/23/2021. Discussed with patient physical exam, impression, and plan. Physical exam both knees noted for tenderness at the medial and lateral patellar facets. He has full extension and flexion to 130 degrees both knees. There is no appreciable collateral ligament laxity. There is positive patella inhibition and grind both knees. Clinical impression is that he has symptoms from abnormal patella for mechanics aggravated by his injury. Symptoms have persisted and worsened despite conservative management of knee brace, formal therapy, and home exercises. Advised patient that he may benefit from injections, and surgery/invasive management not warranted unless failed conservative management. He is a good candidate for steroid injection. I recommend he follow-up with [employer]. regarding getting approved for steroid injections. He was advised if no improvement steroid injection we will move forward with viscosupplementation. He was given information to consider PRP injection if viscosupplementation does not help. Subjective:   Patient ID: Greer Frazier is a 47 y.o. male. Chief Complaint   Patient presents with    Left Knee - Follow-up    Right Knee - Follow-up        62-year-old male following up for onset of bilateral knee pain and left-sided rib pain from injury at work on 10/23/2021.   He was last seen by me more than 19 months ago at which point he was advised to continue home exercise program.  He states that his symptoms were improved after completing formal therapy, but did not resolve. He resumed working full duty and states that with doing so pain in both knees has worsened, right knee pain worse than the right. He has pain described localized to the anterolateral and anteromedial parapatellar aspect both knees, burning and sometimes sharp, worse with climbing up and down ladders, worse with repetitive kneeling and squatting, associated with clicking of the kneecaps, and improved with resting. He states symptoms have been bothersome for quite some time however he has attempted to continue being physically active and continue working. He does report having a hectic/strange work schedule states it has been a challenge for him to schedule appoint for follow-up. He does have knee brace and knee sleeve which he wears to help manage his symptoms. Knee Pain  This is a chronic problem. The current episode started more than 1 year ago. The problem occurs daily. The problem has been gradually worsening. Associated symptoms include arthralgias. Pertinent negatives include no joint swelling, numbness or weakness. The symptoms are aggravated by bending. He has tried rest and position changes (Knee brace, knee sleeve) for the symptoms. The treatment provided mild relief. Review of Systems   Musculoskeletal:  Positive for arthralgias. Negative for joint swelling. Neurological:  Negative for weakness and numbness. Objective:  Vitals:    11/24/23 1314   BP: 132/82   Pulse: 85   Weight: 82.4 kg (181 lb 9.6 oz)   Height: 5' 9" (1.753 m)      Right Knee Exam     Muscle Strength   The patient has normal right knee strength. Tenderness   Right knee tenderness location: Patella facet. Range of Motion   Extension:  normal   Flexion:  130     Tests   Varus: negative Valgus: negative    Comments:  Positive patella inhibition and grind      Left Knee Exam     Muscle Strength   The patient has normal left knee strength.     Tenderness   Left knee tenderness location: Patella facet. Range of Motion   Extension:  normal   Flexion:  130     Tests   Varus: negative Valgus: negative    Comments:  Positive patella inhibition and grind            Physical Exam  Vitals and nursing note reviewed. Constitutional:       Appearance: Normal appearance. He is well-developed. He is not ill-appearing or diaphoretic. HENT:      Head: Normocephalic and atraumatic. Right Ear: External ear normal.      Left Ear: External ear normal.   Eyes:      Conjunctiva/sclera: Conjunctivae normal.   Neck:      Trachea: No tracheal deviation. Cardiovascular:      Rate and Rhythm: Normal rate. Pulmonary:      Effort: Pulmonary effort is normal. No respiratory distress. Abdominal:      General: There is no distension. Musculoskeletal:         General: Tenderness present. No swelling, deformity or signs of injury. Skin:     General: Skin is warm and dry. Coloration: Skin is not jaundiced or pale. Neurological:      Mental Status: He is alert and oriented to person, place, and time. Psychiatric:         Mood and Affect: Mood normal.         Behavior: Behavior normal.         Thought Content:  Thought content normal.         Judgment: Judgment normal.

## 2023-11-24 NOTE — PATIENT INSTRUCTIONS
I recommend steroid/cortisone injection and if no improvement/good response next step would be viscosupplementation, in which synthetic hyaluronic acid is injected into the knees. May consider PRP (platelet rich plasma) in the future, however this is not usually covered by insurance.

## 2023-11-24 NOTE — LETTER
November 24, 2023     Patient: Francee Hatchet  YOB: 1970  Date of Visit: 11/24/2023      To Whom it May Concern:    Francee Hatchet is under my professional care. Jordan Marcus was seen in my office on 11/24/2023. Jordan Marcus may continue working full duty. [place] is recommended to have injections done to both knees. If you have any questions or concerns, please don't hesitate to call.          Sincerely,          Tom Rice DO        CC: No Recipients

## 2024-04-16 ENCOUNTER — TELEPHONE (OUTPATIENT)
Dept: FAMILY MEDICINE CLINIC | Facility: CLINIC | Age: 54
End: 2024-04-16

## 2024-04-16 DIAGNOSIS — Z12.5 SCREENING FOR PROSTATE CANCER: Primary | ICD-10-CM

## 2024-04-16 DIAGNOSIS — R73.01 IMPAIRED FASTING GLUCOSE: ICD-10-CM

## 2024-04-16 DIAGNOSIS — Z13.220 NEED FOR LIPID SCREENING: ICD-10-CM

## 2024-04-16 DIAGNOSIS — Z13.1 SCREENING FOR DIABETES MELLITUS: ICD-10-CM

## 2024-04-16 NOTE — TELEPHONE ENCOUNTER
Pt has appt on 5/8 and his wife called and asked if you can put lab work in for him to complete before his appt.

## 2024-04-22 DIAGNOSIS — M25.50 ARTHRALGIA, UNSPECIFIED JOINT: Primary | ICD-10-CM

## 2024-05-01 ENCOUNTER — APPOINTMENT (OUTPATIENT)
Dept: LAB | Facility: HOSPITAL | Age: 54
End: 2024-05-01
Attending: FAMILY MEDICINE
Payer: COMMERCIAL

## 2024-05-01 DIAGNOSIS — Z12.5 SCREENING FOR PROSTATE CANCER: ICD-10-CM

## 2024-05-01 DIAGNOSIS — Z13.220 NEED FOR LIPID SCREENING: ICD-10-CM

## 2024-05-01 DIAGNOSIS — R73.01 IMPAIRED FASTING GLUCOSE: ICD-10-CM

## 2024-05-01 DIAGNOSIS — M25.50 ARTHRALGIA, UNSPECIFIED JOINT: ICD-10-CM

## 2024-05-01 LAB
B BURGDOR IGG+IGM SER QL IA: NEGATIVE
CHOLEST SERPL-MCNC: 230 MG/DL
EST. AVERAGE GLUCOSE BLD GHB EST-MCNC: 123 MG/DL
HBA1C MFR BLD: 5.9 %
HDLC SERPL-MCNC: 54 MG/DL
LDLC SERPL CALC-MCNC: 157 MG/DL (ref 0–100)
NONHDLC SERPL-MCNC: 176 MG/DL
PSA SERPL-MCNC: 0.82 NG/ML (ref 0–4)
TRIGL SERPL-MCNC: 93 MG/DL

## 2024-05-01 PROCEDURE — 36415 COLL VENOUS BLD VENIPUNCTURE: CPT

## 2024-05-01 PROCEDURE — 86618 LYME DISEASE ANTIBODY: CPT

## 2024-05-01 PROCEDURE — G0103 PSA SCREENING: HCPCS

## 2024-05-01 PROCEDURE — 80061 LIPID PANEL: CPT

## 2024-05-01 PROCEDURE — 80053 COMPREHEN METABOLIC PANEL: CPT

## 2024-05-01 PROCEDURE — 83036 HEMOGLOBIN GLYCOSYLATED A1C: CPT

## 2024-05-08 ENCOUNTER — OFFICE VISIT (OUTPATIENT)
Dept: FAMILY MEDICINE CLINIC | Facility: CLINIC | Age: 54
End: 2024-05-08
Payer: COMMERCIAL

## 2024-05-08 VITALS
HEIGHT: 69 IN | TEMPERATURE: 97.4 F | DIASTOLIC BLOOD PRESSURE: 84 MMHG | WEIGHT: 180.8 LBS | HEART RATE: 75 BPM | OXYGEN SATURATION: 97 % | SYSTOLIC BLOOD PRESSURE: 130 MMHG | BODY MASS INDEX: 26.78 KG/M2

## 2024-05-08 DIAGNOSIS — J45.909 ASTHMA DUE TO ENVIRONMENTAL ALLERGIES: ICD-10-CM

## 2024-05-08 DIAGNOSIS — R53.83 OTHER FATIGUE: ICD-10-CM

## 2024-05-08 DIAGNOSIS — F17.210 CIGARETTE NICOTINE DEPENDENCE WITHOUT COMPLICATION: ICD-10-CM

## 2024-05-08 DIAGNOSIS — Z91.89 AT RISK FOR HEART DISEASE: ICD-10-CM

## 2024-05-08 DIAGNOSIS — R07.9 CHEST PAIN, UNSPECIFIED TYPE: ICD-10-CM

## 2024-05-08 DIAGNOSIS — Z00.00 HEALTH MAINTENANCE EXAMINATION: Primary | ICD-10-CM

## 2024-05-08 PROBLEM — IMO0001 SMOKING: Chronic | Status: RESOLVED | Noted: 2022-06-06 | Resolved: 2024-05-08

## 2024-05-08 PROBLEM — G44.52 NEW DAILY PERSISTENT HEADACHE: Status: RESOLVED | Noted: 2021-10-07 | Resolved: 2024-05-08

## 2024-05-08 PROBLEM — F17.200 SMOKING: Chronic | Status: RESOLVED | Noted: 2022-06-06 | Resolved: 2024-05-08

## 2024-05-08 PROBLEM — S83.511A SPRAIN OF ANTERIOR CRUCIATE LIGAMENT OF RIGHT KNEE: Status: RESOLVED | Noted: 2022-01-21 | Resolved: 2024-05-08

## 2024-05-08 PROBLEM — R43.2 DYSGEUSIA: Status: RESOLVED | Noted: 2022-02-21 | Resolved: 2024-05-08

## 2024-05-08 PROBLEM — I10 ESSENTIAL (PRIMARY) HYPERTENSION: Status: RESOLVED | Noted: 2022-12-30 | Resolved: 2024-05-08
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
## 2024-05-08 LAB
ALBUMIN SERPL BCP-MCNC: 4.4 G/DL (ref 3.5–5)
ALP SERPL-CCNC: 51 U/L (ref 34–104)
ALT SERPL W P-5'-P-CCNC: 20 U/L (ref 7–52)
ANION GAP SERPL CALCULATED.3IONS-SCNC: 3 MMOL/L (ref 4–13)
AST SERPL W P-5'-P-CCNC: 20 U/L (ref 13–39)
BILIRUB SERPL-MCNC: 0.41 MG/DL (ref 0.2–1)
BUN SERPL-MCNC: 15 MG/DL (ref 5–25)
CALCIUM SERPL-MCNC: 9.3 MG/DL (ref 8.4–10.2)
CHLORIDE SERPL-SCNC: 107 MMOL/L (ref 96–108)
CO2 SERPL-SCNC: 29 MMOL/L (ref 21–32)
CREAT SERPL-MCNC: 0.89 MG/DL (ref 0.6–1.3)
GFR SERPL CREATININE-BSD FRML MDRD: 96 ML/MIN/1.73SQ M
GLUCOSE P FAST SERPL-MCNC: 111 MG/DL (ref 65–99)
POTASSIUM SERPL-SCNC: 4.7 MMOL/L (ref 3.5–5.3)
PROT SERPL-MCNC: 7.1 G/DL (ref 6.4–8.4)
SODIUM SERPL-SCNC: 139 MMOL/L (ref 135–147)

## 2024-05-08 PROCEDURE — 99396 PREV VISIT EST AGE 40-64: CPT | Performed by: FAMILY MEDICINE

## 2024-05-08 RX ORDER — FLUTICASONE PROPIONATE 50 MCG
1 SPRAY, SUSPENSION (ML) NASAL DAILY
COMMUNITY
End: 2024-05-08 | Stop reason: SDUPTHER

## 2024-05-08 RX ORDER — FLUTICASONE PROPIONATE 50 MCG
1 SPRAY, SUSPENSION (ML) NASAL DAILY
Qty: 48 G | Refills: 3 | Status: SHIPPED | OUTPATIENT
Start: 2024-05-08

## 2024-05-08 NOTE — PROGRESS NOTES
Name: Christopher Sun      : 1970      MRN: [id number]  Encounter Provider: Joseph Goldsmith MD  Encounter Date: 2024   Encounter department: [facility]    Assessment & Plan     1. Health maintenance examination  Normal exam    2. Asthma due to environmental allergies  -     fluticasone (FLONASE) 50 mcg/act nasal spray; 1 spray into each nostril daily    3. Chest pain, unspecified type  -     Stress test only, exercise; Future; Expected date: 2024  If symptoms worsen recommend going to the ER    4. Other fatigue  -     CBC and differential; Future  -     TSH, 3rd generation with Free T4 reflex; Future    5. Cigarette nicotine dependence without complication  -     CT lung screening program; Future; Expected date: 2024    6. At risk for heart disease  We discussed starting statin.  He would like to see what stress test shows first    Follow up in 6 months or as needed         Subjective     Patient is here for a yearly physical.  He had blood work done which shoed elevated cholesterol also has elevated ASCVD risk. He is a current every day smoker.  He has been having chest pain on and off at rest and during exertion. Last several minutes resolve on its own.      Review of Systems   Constitutional:  Negative for activity change, appetite change, fatigue and fever.   HENT:  Negative for congestion and ear discharge.    Respiratory:  Negative for cough and shortness of breath.    Cardiovascular:  Positive for chest pain. Negative for palpitations.   Gastrointestinal:  Negative for diarrhea and nausea.   Musculoskeletal:  Negative for arthralgias and back pain.   Skin:  Negative for color change and rash.   Neurological:  Negative for dizziness and headaches.   Psychiatric/Behavioral:  Negative for agitation and behavioral problems.        Past Medical History:   Diagnosis Date    Arrhythmia 2013    after halter monitor-was told mild arythmia    Hyperlipidemia      Hypertension      History reviewed. No pertinent surgical history.  Family History   Problem Relation Age of Onset    Diabetes Mother     Hypertension Mother     Cancer Mother     Heart disease Father      Social History     Socioeconomic History    Marital status: /Civil Union     Spouse name: None    Number of children: None    Years of education: None    Highest education level: None   Occupational History    None   Tobacco Use    Smoking status: Every Day     Current packs/day: 0.50     Average packs/day: 0.5 packs/day for 30.0 years (15.0 ttl pk-yrs)     Types: Cigarettes    Smokeless tobacco: Never   Vaping Use    Vaping status: Never Used   Substance and Sexual Activity    Alcohol use: Yes     Alcohol/week: 6.0 standard drinks of alcohol     Types: 6 Cans of beer per week    Drug use: Never    Sexual activity: None   Other Topics Concern    None   Social History Narrative    None     Social Determinants of Health     Financial Resource Strain: Not on file   Food Insecurity: Not on file   Transportation Needs: Not on file   Physical Activity: Not on file   Stress: Not on file   Social Connections: Not on file   Intimate Partner Violence: Not on file   Housing Stability: Not on file     Current Outpatient Medications on File Prior to Visit   Medication Sig    [DISCONTINUED] fluticasone (FLONASE) 50 mcg/act nasal spray 1 spray into each nostril daily    aspirin (ECOTRIN LOW STRENGTH) 81 mg EC tablet Take 81 mg by mouth daily (Patient not taking: Reported on 11/24/2023)    [DISCONTINUED] benzonatate (TESSALON) 200 MG capsule Take 1 capsule (200 mg total) by mouth 3 (three) times a day as needed for cough (Patient not taking: Reported on 5/8/2024)    [DISCONTINUED] dicyclomine (BENTYL) 20 mg tablet Take 1 tablet (20 mg total) by mouth every 6 (six) hours (Patient not taking: Reported on 11/24/2023)    [DISCONTINUED] HYDROcodone Bit-Homatrop MBr 5-1.5 MG TABS Take 1 tablet by mouth 3 (three) times a day as  "needed (cough) Max Daily Amount: 3 tablets (Patient not taking: Reported on 11/24/2023)    [DISCONTINUED] omeprazole (PriLOSEC) 40 MG capsule Take 1 capsule (40 mg total) by mouth daily (Patient not taking: Reported on 5/8/2024)    [DISCONTINUED] propranolol (INDERAL) 40 mg tablet Take 1 tablet (40 mg total) by mouth 2 (two) times a day (Patient not taking: Reported on 5/8/2024)    [DISCONTINUED] sucralfate (CARAFATE) 1 g tablet Take 1 tablet (1 g total) by mouth 4 (four) times a day for 7 days (Patient not taking: Reported on 5/8/2024)     Allergies   Allergen Reactions    Ephedrine Other (See Comments)     Face turns white     Immunization History   Administered Date(s) Administered    COVID-19 MODERNA VACC 0.25 ML IM BOOSTER 01/12/2022    COVID-19 MODERNA VACC 0.5 ML IM 04/02/2021, 04/30/2021    Td (adult), adsorbed 08/21/2008    Tdap 09/29/2021       Objective     /84 (BP Location: Left arm, Patient Position: Sitting, Cuff Size: Large)   Pulse 75   Temp (!) 97.4 °F (36.3 °C)   Ht 5' 9\" (1.753 m)   Wt 82 kg (180 lb 12.8 oz)   SpO2 97%   BMI 26.70 kg/m²     Physical Exam  Constitutional:       General: He is not in acute distress.     Appearance: He is well-developed. He is not diaphoretic.   Eyes:      General: No scleral icterus.     Pupils: Pupils are equal, round, and reactive to light.   Cardiovascular:      Rate and Rhythm: Normal rate and regular rhythm.      Heart sounds: Normal heart sounds. No murmur heard.  Pulmonary:      Effort: Pulmonary effort is normal. No respiratory distress.      Breath sounds: Normal breath sounds. No wheezing.   Abdominal:      General: Bowel sounds are normal. There is no distension.      Palpations: Abdomen is soft.      Tenderness: There is no abdominal tenderness.   Skin:     General: Skin is warm and dry.      Findings: No rash.   Neurological:      Mental Status: He is alert and oriented to person, place, and time.       Joseph Goldsmith MD    "

## 2024-05-16 ENCOUNTER — APPOINTMENT (OUTPATIENT)
Dept: LAB | Facility: HOSPITAL | Age: 54
End: 2024-05-16
Payer: COMMERCIAL

## 2024-05-16 ENCOUNTER — HOSPITAL ENCOUNTER (OUTPATIENT)
Dept: NON INVASIVE DIAGNOSTICS | Facility: HOSPITAL | Age: 54
Discharge: HOME | End: 2024-05-16
Attending: FAMILY MEDICINE
Payer: COMMERCIAL

## 2024-05-16 ENCOUNTER — TELEPHONE (OUTPATIENT)
Dept: FAMILY MEDICINE CLINIC | Facility: CLINIC | Age: 54
End: 2024-05-16

## 2024-05-16 ENCOUNTER — TELEPHONE (OUTPATIENT)
Age: 54
End: 2024-05-16

## 2024-05-16 VITALS — OXYGEN SATURATION: 98 % | SYSTOLIC BLOOD PRESSURE: 118 MMHG | HEART RATE: 82 BPM | DIASTOLIC BLOOD PRESSURE: 78 MMHG

## 2024-05-16 DIAGNOSIS — R53.83 OTHER FATIGUE: ICD-10-CM

## 2024-05-16 DIAGNOSIS — R07.9 CHEST PAIN, UNSPECIFIED TYPE: ICD-10-CM

## 2024-05-16 DIAGNOSIS — R94.39 ABNORMAL STRESS ELECTROCARDIOGRAPHY USING TREADMILL: Primary | ICD-10-CM

## 2024-05-16 LAB
BASOPHILS # BLD AUTO: 0.05 THOUSANDS/ÂΜL (ref 0–0.1)
BASOPHILS NFR BLD AUTO: 1 % (ref 0–1)
EOSINOPHIL # BLD AUTO: 0.18 THOUSAND/ÂΜL (ref 0–0.61)
EOSINOPHIL NFR BLD AUTO: 2 % (ref 0–6)
ERYTHROCYTE [DISTWIDTH] IN BLOOD BY AUTOMATED COUNT: 13.2 % (ref 11.6–15.1)
HCT VFR BLD AUTO: 49.9 % (ref 36.5–49.3)
HGB BLD-MCNC: 16.3 G/DL (ref 12–17)
IMM GRANULOCYTES # BLD AUTO: 0.01 THOUSAND/UL (ref 0–0.2)
IMM GRANULOCYTES NFR BLD AUTO: 0 % (ref 0–2)
LYMPHOCYTES # BLD AUTO: 2.36 THOUSANDS/ÂΜL (ref 0.6–4.47)
LYMPHOCYTES NFR BLD AUTO: 32 % (ref 14–44)
MAX DIASTOLIC BP: 78 MMHG
MAX PREDICTED HEART RATE: 166 BPM
MCH RBC QN AUTO: 30.8 PG (ref 26.8–34.3)
MCHC RBC AUTO-ENTMCNC: 32.7 G/DL (ref 31.4–37.4)
MCV RBC AUTO: 94 FL (ref 82–98)
MONOCYTES # BLD AUTO: 0.78 THOUSAND/ÂΜL (ref 0.17–1.22)
MONOCYTES NFR BLD AUTO: 10 % (ref 4–12)
NEUTROPHILS # BLD AUTO: 4.11 THOUSANDS/ÂΜL (ref 1.85–7.62)
NEUTS SEG NFR BLD AUTO: 55 % (ref 43–75)
NRBC BLD AUTO-RTO: 0 /100 WBCS
PLATELET # BLD AUTO: 239 THOUSANDS/UL (ref 149–390)
PMV BLD AUTO: 9.7 FL (ref 8.9–12.7)
PROTOCOL NAME: NORMAL
RBC # BLD AUTO: 5.29 MILLION/UL (ref 3.88–5.62)
REASON FOR TERMINATION: NORMAL
STRESS BASELINE BP: NORMAL MMHG
STRESS BASELINE HR: 82 BPM
STRESS O2 SAT REST: 98 %
STRESS POST EXERCISE DUR MIN: 0 MIN
STRESS POST EXERCISE DUR SEC: 0 SEC
STRESS POST PEAK HR: 92 BPM
STRESS POST PEAK SYSTOLIC BP: 118 MMHG
TARGET HR FORMULA: NORMAL
TEST INDICATION: NORMAL
TSH SERPL DL<=0.05 MIU/L-ACNC: 2.7 UIU/ML (ref 0.45–4.5)
WBC # BLD AUTO: 7.49 THOUSAND/UL (ref 4.31–10.16)

## 2024-05-16 PROCEDURE — 93018 CV STRESS TEST I&R ONLY: CPT | Performed by: INTERNAL MEDICINE

## 2024-05-16 PROCEDURE — 84443 ASSAY THYROID STIM HORMONE: CPT

## 2024-05-16 PROCEDURE — 93016 CV STRESS TEST SUPVJ ONLY: CPT | Performed by: INTERNAL MEDICINE

## 2024-05-16 PROCEDURE — 85025 COMPLETE CBC W/AUTO DIFF WBC: CPT

## 2024-05-16 PROCEDURE — 36415 COLL VENOUS BLD VENIPUNCTURE: CPT

## 2024-05-16 PROCEDURE — 93017 CV STRESS TEST TRACING ONLY: CPT

## 2024-05-16 NOTE — TELEPHONE ENCOUNTER
[facility].  She is asking that a nuclear stress test with imaging or an echo be ordered for patient by the doctor.  An ST segment on a resting EKG was found.  If you have any questions, please call Romina at [phone]

## 2024-05-16 NOTE — TELEPHONE ENCOUNTER
Patient was returning a call, I transferred him to [facility] in the clinical team.

## 2024-05-16 NOTE — TELEPHONE ENCOUNTER
Chief Complaint   Patient presents with    Cough    Nasal Discharge     1. Have you been to the ER, urgent care clinic since your last visit? Hospitalized since your last visit? No    2. Have you seen or consulted any other health care providers outside of the [address] [place] [address] since your last visit? Include any pap smears or colon screening. No     Pt accompanied by father. LMOMTCB    ----- Message from Joseph Goldsmith MD sent at 5/16/2024  1:46 PM EDT -----  His stress test was not conclusive given abnormalities noted on baseline EKG recommend stress test with imaging modality. Advise pt.

## 2024-05-16 NOTE — TELEPHONE ENCOUNTER
LMOMTCB  ----- Message from Joseph Goldsmith MD sent at 5/16/2024  1:44 PM EDT -----  Cbc and TSH both normal advise pt.

## 2024-05-17 ENCOUNTER — HOSPITAL ENCOUNTER (OUTPATIENT)
Dept: CT IMAGING | Facility: HOSPITAL | Age: 54
End: 2024-05-17
Attending: FAMILY MEDICINE
Payer: COMMERCIAL

## 2024-05-17 DIAGNOSIS — F17.210 CIGARETTE NICOTINE DEPENDENCE WITHOUT COMPLICATION: ICD-10-CM

## 2024-05-17 PROCEDURE — 71271 CT THORAX LUNG CANCER SCR C-: CPT

## 2024-05-20 ENCOUNTER — HOSPITAL ENCOUNTER (OUTPATIENT)
Dept: NUCLEAR MEDICINE | Facility: HOSPITAL | Age: 54
Discharge: HOME | End: 2024-05-20
Payer: COMMERCIAL

## 2024-05-20 ENCOUNTER — HOSPITAL ENCOUNTER (OUTPATIENT)
Dept: NON INVASIVE DIAGNOSTICS | Facility: HOSPITAL | Age: 54
Discharge: HOME | End: 2024-05-20

## 2024-05-20 VITALS — WEIGHT: 180 LBS | BODY MASS INDEX: 26.66 KG/M2 | HEIGHT: 69 IN

## 2024-05-20 DIAGNOSIS — R94.39 ABNORMAL STRESS ELECTROCARDIOGRAPHY USING TREADMILL: ICD-10-CM

## 2024-05-20 DIAGNOSIS — R94.39 ABNORMAL STRESS TEST: Primary | ICD-10-CM

## 2024-05-20 LAB
ARRHY DURING EX: NORMAL
CHEST PAIN STATEMENT: NORMAL
MAX DIASTOLIC BP: 84 MMHG
MAX HR: 111 BPM
MAX PREDICTED HEART RATE: 166 BPM
NUC STRESS EJECTION FRACTION: 59 %
PROTOCOL NAME: NORMAL
RATE PRESSURE PRODUCT: NORMAL
REASON FOR TERMINATION: NORMAL
SL CV REST NUCLEAR ISOTOPE DOSE: 11 MCI
SL CV STRESS NUCLEAR ISOTOPE DOSE: 33 MCI
SL CV STRESS RECOVERY BP: NORMAL MMHG
SL CV STRESS RECOVERY HR: 82 BPM
STRESS BASELINE BP: NORMAL MMHG
STRESS BASELINE HR: 70 BPM
STRESS O2 SAT REST: 99 %
STRESS PEAK HR: 111 BPM
STRESS POST EXERCISE DUR MIN: 3 MIN
STRESS POST EXERCISE DUR SEC: 0 SEC
STRESS POST O2 SAT PEAK: 100 %
STRESS POST PEAK BP: 130 MMHG
STRESS POST PEAK HR: 111 BPM
STRESS POST PEAK SYSTOLIC BP: 133 MMHG
STRESS/REST PERFUSION RATIO: 1.13
TARGET HR FORMULA: NORMAL

## 2024-05-20 PROCEDURE — 93017 CV STRESS TEST TRACING ONLY: CPT

## 2024-05-20 PROCEDURE — 78452 HT MUSCLE IMAGE SPECT MULT: CPT | Performed by: INTERNAL MEDICINE

## 2024-05-20 PROCEDURE — 93016 CV STRESS TEST SUPVJ ONLY: CPT | Performed by: INTERNAL MEDICINE

## 2024-05-20 PROCEDURE — 78452 HT MUSCLE IMAGE SPECT MULT: CPT

## 2024-05-20 PROCEDURE — A9502 TC99M TETROFOSMIN: HCPCS

## 2024-05-20 PROCEDURE — 93018 CV STRESS TEST I&R ONLY: CPT | Performed by: INTERNAL MEDICINE

## 2024-05-20 RX ORDER — ATORVASTATIN CALCIUM 20 MG/1
20 TABLET, FILM COATED ORAL DAILY
Qty: 100 TABLET | Refills: 3 | Status: SHIPPED | OUTPATIENT
Start: 2024-05-20

## 2024-05-21 ENCOUNTER — CONSULT (OUTPATIENT)
Dept: CARDIOLOGY CLINIC | Facility: MEDICAL CENTER | Age: 54
End: 2024-05-21
Payer: COMMERCIAL

## 2024-05-21 ENCOUNTER — TELEPHONE (OUTPATIENT)
Age: 54
End: 2024-05-21

## 2024-05-21 VITALS
WEIGHT: 180 LBS | DIASTOLIC BLOOD PRESSURE: 70 MMHG | HEIGHT: 69 IN | SYSTOLIC BLOOD PRESSURE: 118 MMHG | OXYGEN SATURATION: 97 % | BODY MASS INDEX: 26.66 KG/M2 | HEART RATE: 86 BPM

## 2024-05-21 DIAGNOSIS — F17.210 CIGARETTE NICOTINE DEPENDENCE WITHOUT COMPLICATION: ICD-10-CM

## 2024-05-21 DIAGNOSIS — I20.0 UNSTABLE ANGINA (HCC): Primary | ICD-10-CM

## 2024-05-21 DIAGNOSIS — R07.2 PRECORDIAL PAIN: ICD-10-CM

## 2024-05-21 DIAGNOSIS — R94.39 ABNORMAL STRESS TEST: ICD-10-CM

## 2024-05-21 PROBLEM — Z82.49 FAMILY HISTORY OF HEART DISEASE: Status: ACTIVE | Noted: 2024-05-21

## 2024-05-21 PROCEDURE — 99204 OFFICE O/P NEW MOD 45 MIN: CPT | Performed by: INTERNAL MEDICINE

## 2024-05-21 NOTE — PROGRESS NOTES
[facility] Cardiology  Office Consultation  Christopher Sun 53 y.o. male MRN: [id number]        1. Unstable angina (HCC)  -     Case Request Cath Lab: Cardiac catheterization; Standing  -     Case Request Cath Lab: Cardiac catheterization  2. Abnormal stress test  -     Ambulatory Referral to Cardiology  -     Echo complete w/ contrast if indicated; Future; Expected date: 05/21/2024  -     EKG 12 lead; Future  -     Protime-INR; Future  -     CBC and Platelet; Future  3. Cigarette nicotine dependence without complication  4. Precordial pain      Plan    He has significant untreated hypercholesterolemia, smoker, and strong family history, dad had heart disease started in his 50s, thus I am concerned about his symptoms which at times are at rest and at times exertional, in the context of his above risk factors and a baseline abnormal EKG requiring Lexiscan Myoview stress test that was abnormal.  He should proceed with cardiac catheterization  Patient start atorvastatin immediately  He needs to change his diet, meat and potatoes is his diet by description, increase plant-based, decreased saturated fats, animal products  He needs to quit smoking      Reason for Consult / Principal Problem: Chest pain, abnormal stress test    HPI: Christopher Sun is a 53 y.o. year old male with longtime smoking, untreated hypercholesterolemia, intermediate risk by ASCVD criteria, family history of premature CAD in his dad at age 55 or so, who has been having chest discomfort on and off sometimes at rest, sometimes with exertion, prompting his PCP to send him for a stress test.  He showed up for a treadmill stress test but his baseline EKG was abnormal, with ST depression and T wave inversions, but in the absence of rest symptoms at that time, he had just switched to a Lexiscan Myoview stress test which suggested anteroseptal and inferior perfusion defects, although the inferior perfusion defect did not improve with prone imaging.  Despite him  having symptoms at rest and on occasion with exertion, he recently was able to spread for yards of filled dirt for landscaping at his home.        Review of Systems   Constitutional:  Negative for appetite change, diaphoresis, fatigue and fever.   Respiratory:  Negative for chest tightness, shortness of breath and wheezing.    Cardiovascular:  Positive for chest pain. Negative for palpitations and leg swelling.   Gastrointestinal:  Negative for abdominal pain and blood in stool.   Musculoskeletal:  Negative for arthralgias and joint swelling.   Skin:  Negative for rash.   Neurological:  Negative for dizziness, syncope and light-headedness.   All other systems reviewed and are negative.        Past Medical History:   Diagnosis Date   • Arrhythmia 04/24/2013    after halter monitor-was told mild arythmia   • Hyperlipidemia    • Hypertension      History reviewed. No pertinent surgical history.  Social History     Substance and Sexual Activity   Alcohol Use Yes   • Alcohol/week: 6.0 standard drinks of alcohol   • Types: 6 Cans of beer per week     Social History     Substance and Sexual Activity   Drug Use Never     Social History     Tobacco Use   Smoking Status Every Day   • Current packs/day: 0.50   • Average packs/day: 0.5 packs/day for 30.0 years (15.0 ttl pk-yrs)   • Types: Cigarettes   Smokeless Tobacco Never     Family History   Problem Relation Age of Onset   • Diabetes Mother    • Hypertension Mother    • Cancer Mother    • Heart disease Father        Allergies:  Allergies   Allergen Reactions   • Ephedrine Other (See Comments)     Face turns white       Medications:     Current Outpatient Medications:   •  atorvastatin (LIPITOR) 20 mg tablet, Take 1 tablet (20 mg total) by mouth daily, Disp: 100 tablet, Rfl: 3  •  fluticasone (FLONASE) 50 mcg/act nasal spray, 1 spray into each nostril daily, Disp: 48 g, Rfl: 3  •  aspirin (ECOTRIN LOW STRENGTH) 81 mg EC tablet, Take 81 mg by mouth daily (Patient not taking:  Reported on 11/24/2023), Disp: , Rfl:   ?    Vitals:    05/21/24 1339   BP: 118/70   Pulse: 86   SpO2: 97%     Weight (last 2 days)     Date/Time Weight    05/21/24 1339 81.6 (180)        Physical Exam  Constitutional:       General: He is not in acute distress.     Appearance: Normal appearance. He is well-developed. He is not diaphoretic.   HENT:      Head: Normocephalic and atraumatic.   Eyes:      General: No scleral icterus.     Conjunctiva/sclera: Conjunctivae normal.      Pupils: Pupils are equal, round, and reactive to light.   Neck:      Thyroid: No thyromegaly.      Vascular: No JVD.   Cardiovascular:      Rate and Rhythm: Normal rate and regular rhythm.      Heart sounds: Normal heart sounds. No murmur heard.     No friction rub. No gallop.   Pulmonary:      Effort: Pulmonary effort is normal. No respiratory distress.      Breath sounds: Normal breath sounds. No wheezing or rales.   Abdominal:      General: Bowel sounds are normal. There is no distension.      Palpations: Abdomen is soft.      Tenderness: There is no abdominal tenderness.   Musculoskeletal:         General: No deformity. Normal range of motion.      Cervical back: Normal range of motion and neck supple.      Right lower leg: No edema.      Left lower leg: No edema.   Skin:     General: Skin is warm and dry.      Findings: No erythema or rash.   Neurological:      General: No focal deficit present.      Mental Status: He is alert and oriented to person, place, and time.      Cranial Nerves: No cranial nerve deficit.      Motor: No weakness.           Laboratory Studies:    Laboratory studies personally reviewed    Cardiac testing:     EKG reviewed personally:   No results found for this visit on 05/21/24.    Echocardiogram:      Stress test:  5/20/2024-personally reviewed-Lexiscan Myoview stress test-baseline EKG abnormalities with lateral T wave inversion, anteroseptal reversible perfusion abnormality, inferior perfusion abnormality that  "resolves on prone imaging.    Holter:      Cardiac catheterization:        Adolfo Roy MD    Portions of the record may have been created with voice recognition software.  Occasional wrong word or \"sound a like\" substitutions may have occurred due to the inherent limitations of voice recognition software.  Read the chart carefully and recognize, using context, where substitutions have occurred.  "

## 2024-05-21 NOTE — TELEPHONE ENCOUNTER
Caller: Christopher Sun     Doctor: N/A    Reason for call: Patient has a STAT referral in the system and is requesting to see Dr. Jackson. Patient saw him 10+  years ago and would like to re establish care. Can someone please call him and set up soonest available?     Call back#: [phone]

## 2024-05-21 NOTE — TELEPHONE ENCOUNTER
Spoke to patient informed him that we do not have any availability until July 12th.  Suggested that patient call his  PCP and see what he would like him to do.

## 2024-05-21 NOTE — H&P (VIEW-ONLY)
[facility] Cardiology  Office Consultation  Christopher Sun 53 y.o. male MRN: [id number]        1. Unstable angina (HCC)  -     Case Request Cath Lab: Cardiac catheterization; Standing  -     Case Request Cath Lab: Cardiac catheterization  2. Abnormal stress test  -     Ambulatory Referral to Cardiology  -     Echo complete w/ contrast if indicated; Future; Expected date: 05/21/2024  -     EKG 12 lead; Future  -     Protime-INR; Future  -     CBC and Platelet; Future  3. Cigarette nicotine dependence without complication  4. Precordial pain      Plan    He has significant untreated hypercholesterolemia, smoker, and strong family history, dad had heart disease started in his 50s, thus I am concerned about his symptoms which at times are at rest and at times exertional, in the context of his above risk factors and a baseline abnormal EKG requiring Lexiscan Myoview stress test that was abnormal.  He should proceed with cardiac catheterization  Patient start atorvastatin immediately  He needs to change his diet, meat and potatoes is his diet by description, increase plant-based, decreased saturated fats, animal products  He needs to quit smoking      Reason for Consult / Principal Problem: Chest pain, abnormal stress test    HPI: Christopher Sun is a 53 y.o. year old male with longtime smoking, untreated hypercholesterolemia, intermediate risk by ASCVD criteria, family history of premature CAD in his dad at age 55 or so, who has been having chest discomfort on and off sometimes at rest, sometimes with exertion, prompting his PCP to send him for a stress test.  He showed up for a treadmill stress test but his baseline EKG was abnormal, with ST depression and T wave inversions, but in the absence of rest symptoms at that time, he had just switched to a Lexiscan Myoview stress test which suggested anteroseptal and inferior perfusion defects, although the inferior perfusion defect did not improve with prone imaging.  Despite him  having symptoms at rest and on occasion with exertion, he recently was able to spread for yards of filled dirt for landscaping at his home.        Review of Systems   Constitutional:  Negative for appetite change, diaphoresis, fatigue and fever.   Respiratory:  Negative for chest tightness, shortness of breath and wheezing.    Cardiovascular:  Positive for chest pain. Negative for palpitations and leg swelling.   Gastrointestinal:  Negative for abdominal pain and blood in stool.   Musculoskeletal:  Negative for arthralgias and joint swelling.   Skin:  Negative for rash.   Neurological:  Negative for dizziness, syncope and light-headedness.   All other systems reviewed and are negative.        Past Medical History:   Diagnosis Date   • Arrhythmia 04/24/2013    after halter monitor-was told mild arythmia   • Hyperlipidemia    • Hypertension      History reviewed. No pertinent surgical history.  Social History     Substance and Sexual Activity   Alcohol Use Yes   • Alcohol/week: 6.0 standard drinks of alcohol   • Types: 6 Cans of beer per week     Social History     Substance and Sexual Activity   Drug Use Never     Social History     Tobacco Use   Smoking Status Every Day   • Current packs/day: 0.50   • Average packs/day: 0.5 packs/day for 30.0 years (15.0 ttl pk-yrs)   • Types: Cigarettes   Smokeless Tobacco Never     Family History   Problem Relation Age of Onset   • Diabetes Mother    • Hypertension Mother    • Cancer Mother    • Heart disease Father        Allergies:  Allergies   Allergen Reactions   • Ephedrine Other (See Comments)     Face turns white       Medications:     Current Outpatient Medications:   •  atorvastatin (LIPITOR) 20 mg tablet, Take 1 tablet (20 mg total) by mouth daily, Disp: 100 tablet, Rfl: 3  •  fluticasone (FLONASE) 50 mcg/act nasal spray, 1 spray into each nostril daily, Disp: 48 g, Rfl: 3  •  aspirin (ECOTRIN LOW STRENGTH) 81 mg EC tablet, Take 81 mg by mouth daily (Patient not taking:  Reported on 11/24/2023), Disp: , Rfl:   ?    Vitals:    05/21/24 1339   BP: 118/70   Pulse: 86   SpO2: 97%     Weight (last 2 days)     Date/Time Weight    05/21/24 1339 81.6 (180)        Physical Exam  Constitutional:       General: He is not in acute distress.     Appearance: Normal appearance. He is well-developed. He is not diaphoretic.   HENT:      Head: Normocephalic and atraumatic.   Eyes:      General: No scleral icterus.     Conjunctiva/sclera: Conjunctivae normal.      Pupils: Pupils are equal, round, and reactive to light.   Neck:      Thyroid: No thyromegaly.      Vascular: No JVD.   Cardiovascular:      Rate and Rhythm: Normal rate and regular rhythm.      Heart sounds: Normal heart sounds. No murmur heard.     No friction rub. No gallop.   Pulmonary:      Effort: Pulmonary effort is normal. No respiratory distress.      Breath sounds: Normal breath sounds. No wheezing or rales.   Abdominal:      General: Bowel sounds are normal. There is no distension.      Palpations: Abdomen is soft.      Tenderness: There is no abdominal tenderness.   Musculoskeletal:         General: No deformity. Normal range of motion.      Cervical back: Normal range of motion and neck supple.      Right lower leg: No edema.      Left lower leg: No edema.   Skin:     General: Skin is warm and dry.      Findings: No erythema or rash.   Neurological:      General: No focal deficit present.      Mental Status: He is alert and oriented to person, place, and time.      Cranial Nerves: No cranial nerve deficit.      Motor: No weakness.           Laboratory Studies:    Laboratory studies personally reviewed    Cardiac testing:     EKG reviewed personally:   No results found for this visit on 05/21/24.    Echocardiogram:      Stress test:  5/20/2024-personally reviewed-Lexiscan Myoview stress test-baseline EKG abnormalities with lateral T wave inversion, anteroseptal reversible perfusion abnormality, inferior perfusion abnormality that  "resolves on prone imaging.    Holter:      Cardiac catheterization:        Adolfo Roy MD    Portions of the record may have been created with voice recognition software.  Occasional wrong word or \"sound a like\" substitutions may have occurred due to the inherent limitations of voice recognition software.  Read the chart carefully and recognize, using context, where substitutions have occurred.  "

## 2024-05-21 NOTE — TELEPHONE ENCOUNTER
Patient states that he has reached out and Dr Jackson or his practice is not available til after 7/12, he states he is going to call around but would like to know if the doctor could do anything to get him in anywhere sooner

## 2024-05-22 ENCOUNTER — TELEPHONE (OUTPATIENT)
Dept: CARDIOLOGY CLINIC | Facility: CLINIC | Age: 54
End: 2024-05-22

## 2024-05-22 ENCOUNTER — PREP FOR PROCEDURE (OUTPATIENT)
Dept: CARDIOLOGY CLINIC | Facility: CLINIC | Age: 54
End: 2024-05-22

## 2024-05-22 DIAGNOSIS — R94.39 ABNORMAL STRESS TEST: Primary | ICD-10-CM

## 2024-05-22 DIAGNOSIS — R07.2 PRECORDIAL PAIN: ICD-10-CM

## 2024-05-22 DIAGNOSIS — I20.0 UNSTABLE ANGINA (HCC): ICD-10-CM

## 2024-05-22 NOTE — TELEPHONE ENCOUNTER
S/w pt. Advised of card cath scheduled for 5/30. Pt advised to obtain labs ordered ASAP. Message sent to pt via [employer] with instructions provided over the phone. Pt verbalized understanding

## 2024-05-22 NOTE — TELEPHONE ENCOUNTER
Spoke with patient I regard Cardiac Cath referral per Dr Roy to be schedule.  Patient live close to [facility] and prefer to have the procedure done out there.    Kim, can you please help us to schedule this procedure out there.  Patient aware you will call him to set up.  Thank you.     patient, daughter

## 2024-05-24 ENCOUNTER — APPOINTMENT (OUTPATIENT)
Dept: LAB | Facility: HOSPITAL | Age: 54
End: 2024-05-24
Payer: COMMERCIAL

## 2024-05-24 DIAGNOSIS — R94.39 ABNORMAL STRESS TEST: ICD-10-CM

## 2024-05-24 LAB
ERYTHROCYTE [DISTWIDTH] IN BLOOD BY AUTOMATED COUNT: 13.2 % (ref 11.6–15.1)
HCT VFR BLD AUTO: 49.6 % (ref 36.5–49.3)
HGB BLD-MCNC: 16.5 G/DL (ref 12–17)
INR PPP: 0.98 (ref 0.84–1.19)
MCH RBC QN AUTO: 31.5 PG (ref 26.8–34.3)
MCHC RBC AUTO-ENTMCNC: 33.3 G/DL (ref 31.4–37.4)
MCV RBC AUTO: 95 FL (ref 82–98)
PLATELET # BLD AUTO: 244 THOUSANDS/UL (ref 149–390)
PMV BLD AUTO: 10.1 FL (ref 8.9–12.7)
PROTHROMBIN TIME: 13.6 SECONDS (ref 11.6–14.5)
RBC # BLD AUTO: 5.24 MILLION/UL (ref 3.88–5.62)
WBC # BLD AUTO: 9.71 THOUSAND/UL (ref 4.31–10.16)

## 2024-05-24 PROCEDURE — 85027 COMPLETE CBC AUTOMATED: CPT

## 2024-05-24 PROCEDURE — 85610 PROTHROMBIN TIME: CPT

## 2024-05-24 PROCEDURE — 36415 COLL VENOUS BLD VENIPUNCTURE: CPT

## 2024-05-31 ENCOUNTER — HOSPITAL ENCOUNTER (OUTPATIENT)
Facility: HOSPITAL | Age: 54
Setting detail: OUTPATIENT SURGERY
Discharge: HOME | End: 2024-05-31
Attending: INTERNAL MEDICINE | Admitting: INTERNAL MEDICINE
Payer: COMMERCIAL

## 2024-05-31 VITALS
HEIGHT: 69 IN | OXYGEN SATURATION: 97 % | DIASTOLIC BLOOD PRESSURE: 74 MMHG | SYSTOLIC BLOOD PRESSURE: 128 MMHG | BODY MASS INDEX: 26.09 KG/M2 | TEMPERATURE: 98.2 F | RESPIRATION RATE: 20 BRPM | WEIGHT: 176.15 LBS | HEART RATE: 72 BPM

## 2024-05-31 DIAGNOSIS — I20.0 UNSTABLE ANGINA (HCC): ICD-10-CM

## 2024-05-31 DIAGNOSIS — Z98.890 S/P CARDIAC CATHETERIZATION: Primary | ICD-10-CM

## 2024-05-31 DIAGNOSIS — R07.2 PRECORDIAL PAIN: ICD-10-CM

## 2024-05-31 DIAGNOSIS — R94.39 ABNORMAL STRESS TEST: ICD-10-CM

## 2024-05-31 LAB
ATRIAL RATE: 69 BPM
ATRIAL RATE: 72 BPM
P AXIS: 70 DEGREES
P AXIS: 71 DEGREES
PR INTERVAL: 158 MS
PR INTERVAL: 158 MS
QRS AXIS: 100 DEGREES
QRS AXIS: 101 DEGREES
QRSD INTERVAL: 104 MS
QRSD INTERVAL: 108 MS
QT INTERVAL: 406 MS
QT INTERVAL: 410 MS
QTC INTERVAL: 439 MS
QTC INTERVAL: 444 MS
T WAVE AXIS: 46 DEGREES
T WAVE AXIS: 48 DEGREES
VENTRICULAR RATE: 69 BPM
VENTRICULAR RATE: 72 BPM

## 2024-05-31 PROCEDURE — C1769 GUIDE WIRE: HCPCS | Performed by: INTERNAL MEDICINE

## 2024-05-31 PROCEDURE — 93010 ELECTROCARDIOGRAM REPORT: CPT | Performed by: INTERNAL MEDICINE

## 2024-05-31 PROCEDURE — 93458 L HRT ARTERY/VENTRICLE ANGIO: CPT | Performed by: INTERNAL MEDICINE

## 2024-05-31 PROCEDURE — C1894 INTRO/SHEATH, NON-LASER: HCPCS | Performed by: INTERNAL MEDICINE

## 2024-05-31 PROCEDURE — 99152 MOD SED SAME PHYS/QHP 5/>YRS: CPT | Performed by: INTERNAL MEDICINE

## 2024-05-31 PROCEDURE — 93005 ELECTROCARDIOGRAM TRACING: CPT

## 2024-05-31 RX ORDER — LIDOCAINE WITH 8.4% SOD BICARB 0.9%(10ML)
SYRINGE (ML) INJECTION CODE/TRAUMA/SEDATION MEDICATION
Status: DISCONTINUED | OUTPATIENT
Start: 2024-05-31 | End: 2024-05-31 | Stop reason: HOSPADM

## 2024-05-31 RX ORDER — MIDAZOLAM HYDROCHLORIDE 2 MG/2ML
INJECTION, SOLUTION INTRAMUSCULAR; INTRAVENOUS CODE/TRAUMA/SEDATION MEDICATION
Status: DISCONTINUED | OUTPATIENT
Start: 2024-05-31 | End: 2024-05-31 | Stop reason: HOSPADM

## 2024-05-31 RX ORDER — VERAPAMIL HCL 2.5 MG/ML
AMPUL (ML) INTRAVENOUS CODE/TRAUMA/SEDATION MEDICATION
Status: DISCONTINUED | OUTPATIENT
Start: 2024-05-31 | End: 2024-05-31 | Stop reason: HOSPADM

## 2024-05-31 RX ORDER — HEPARIN SODIUM 1000 [USP'U]/ML
INJECTION, SOLUTION INTRAVENOUS; SUBCUTANEOUS CODE/TRAUMA/SEDATION MEDICATION
Status: DISCONTINUED | OUTPATIENT
Start: 2024-05-31 | End: 2024-05-31 | Stop reason: HOSPADM

## 2024-05-31 RX ORDER — SODIUM CHLORIDE 9 MG/ML
75 INJECTION, SOLUTION INTRAVENOUS CONTINUOUS
Status: DISCONTINUED | OUTPATIENT
Start: 2024-05-31 | End: 2024-05-31

## 2024-05-31 RX ORDER — NITROGLYCERIN 20 MG/100ML
INJECTION INTRAVENOUS CODE/TRAUMA/SEDATION MEDICATION
Status: DISCONTINUED | OUTPATIENT
Start: 2024-05-31 | End: 2024-05-31 | Stop reason: HOSPADM

## 2024-05-31 RX ORDER — FENTANYL CITRATE 50 UG/ML
INJECTION, SOLUTION INTRAMUSCULAR; INTRAVENOUS CODE/TRAUMA/SEDATION MEDICATION
Status: DISCONTINUED | OUTPATIENT
Start: 2024-05-31 | End: 2024-05-31 | Stop reason: HOSPADM

## 2024-05-31 RX ORDER — SODIUM CHLORIDE 9 MG/ML
75 INJECTION, SOLUTION INTRAVENOUS CONTINUOUS
Status: DISCONTINUED | OUTPATIENT
Start: 2024-05-31 | End: 2024-05-31 | Stop reason: HOSPADM

## 2024-05-31 NOTE — INTERVAL H&P NOTE
H&P reviewed. After examining the patient I find no changes in the patients condition since the H&P had been written.    Discussed the indications, alternatives, risks and benefit of cardiac catheterization and possible PCI. The procedure risks, benefits, and complications (including but not limited to bleeding, infection, arrhythmia, nephrotoxicity, vessel injury, myocardial infarction, CVA, and death) were reviewed.  Patient is alert and oriented x3 and wishes to proceed. All questions answered.  Denies known history of contrast allergy.     Vitals:    05/31/24 0847   BP: 142/81   Pulse: 76   Resp: 14   Temp: (!) 97.4 °F (36.3 °C)   SpO2: 99%

## 2024-05-31 NOTE — DISCHARGE INSTR - AVS FIRST PAGE
1. Please see the post cardiac catheterization dishcarge instructions.   No heavy lifting greater than 5 lbs for 1 week, no strenuous activity for 48 hrs.    2. Remove band aid tomorrow.  Shower and wash area- wrist gently with soap and water- beginning tomorrow. Rinse and pat dry.  Apply new water seal band aid.  Repeat this process for 5 days. No powders, creams lotions or antibiotic ointments  for 5 days.  No tub baths, hot tubs or swimming for 5 days.     3. Please call our office ([phone]) if you have any fever, redness, swelling, discharge from your wrist access site.    4.No driving for 1 day

## 2024-05-31 NOTE — Clinical Note
Medication given was versed. The indication was Sedation. Neurology Follow up note  Patient seen and examined at bedside, on solumedrol , is s/p 1 dose so far and reports some improvement in bilateral hand and shoulder weakness . Patient now able to  her phone, hold a cup and move her fingers which she was unable since past several wks. Patient also reports improvement in rash and pain/itching  associated with the rash.  CK is gradually trending down. Generalized achy pain persists      Vital Signs Last 24 Hrs  T(C): 35.8 (27 Jun 2018 21:31), Max: 36.5 (27 Jun 2018 15:58)  T(F): 96.5 (27 Jun 2018 21:31), Max: 97.7 (27 Jun 2018 15:58)  HR: 108 (27 Jun 2018 21:31) (102 - 108)  BP: 103/58 (27 Jun 2018 21:31) (103/58 - 128/66)  BP(mean): --  RR: 18 (27 Jun 2018 21:31) (18 - 18)  SpO2: 95% (27 Jun 2018 15:58) (95% - 98%)        Neurological Exam:   Mental status: patient a/o x 4 , speech fluent  Cranial nerves: intact  Motor:  LUE/ RUE 4-/5 prox and distal,  hand  4-/5 b/l              RLE 3/5  LLE 4/5 ,Foot ext and dorsiflexion 3/5 bilaterally  Sensation: intact , reports generalized pain  Reflexes: Brisk bilateral upper extremity                2+/4 Lower extremity  Gait: deferred      Medications  azaTHIOprine 200 milliGRAM(s) Oral daily  enoxaparin Injectable 40 milliGRAM(s) SubCutaneous daily  hydrocortisone 1% Cream 1 Application(s) Topical every 12 hours  methylPREDNISolone sodium succinate IVPB 1000 milliGRAM(s) IV Intermittent daily  oxyCODONE    5 mG/acetaminophen 325 mG 1 Tablet(s) Oral every 6 hours PRN  sodium chloride 0.9%. 1000 milliLiter(s) IV Continuous <Continuous>  traMADol 50 milliGRAM(s) Oral every 12 hours PRN      Lab  Creatine Kinase, Serum (06.26.18 @ 22:55)    Creatine Kinase, Serum: 5881 U/L    Creatine Kinase, Serum (06.26.18 @ 19:40)    Creatine Kinase, Serum: 6411 U/L    Comprehensive Metabolic Panel (06.26.18 @ 19:40)    Sodium, Serum: 134 mmol/L    Potassium, Serum: 4.4 mmol/L    Chloride, Serum: 98 mmol/L    Carbon Dioxide, Serum: 20 mmol/L    Anion Gap, Serum: 16 mmol/L    Blood Urea Nitrogen, Serum: 12 mg/dL    Creatinine, Serum: 0.5 mg/dL    Glucose, Serum: 72 mg/dL    Calcium, Total Serum: 8.6 mg/dL    Protein Total, Serum: 7.9 g/dL    Albumin, Serum: 3.2 g/dL    Bilirubin Total, Serum: 0.2 mg/dL    Alkaline Phosphatase, Serum: 80 U/L    Aspartate Aminotransferase (AST/SGOT): 194 U/L    Alanine Aminotransferase (ALT/SGPT): 92 U/L    eGFR if Non : 123: Interpretative comment    Complete Blood Count + Automated Diff (06.26.18 @ 19:40)    WBC Count: 9.90 K/uL    RBC Count: 3.75 M/uL    Hemoglobin: 11.3 g/dL    Hematocrit: 35.3 %    Mean Cell Volume: 94.1 fL    Mean Cell Hemoglobin: 30.1 pg    Mean Cell Hemoglobin Conc: 32.0 g/dL    Red Cell Distrib Width: 15.5 %    Platelet Count - Automated: 496 K/uL    Auto Neutrophil #: 8.32 K/uL    Auto Lymphocyte #: 0.45 K/uL    Auto Monocyte #: 0.31 K/uL    Auto Eosinophil #: 0.68 K/uL    Auto Basophil #: 0.04 K/uL    Auto Neutrophil %: 84.1: Differential percentages must be correlated with absolute numbers for  clinical significance. %    Auto Lymphocyte %: 4.5 %    Auto Monocyte %: 3.1 %    Auto Eosinophil %: 6.9 %    Auto Basophil %: 0.4 %    Auto Immature Granulocyte %: 1.0 %      Radiology

## 2024-06-06 ENCOUNTER — TELEPHONE (OUTPATIENT)
Dept: CARDIOLOGY CLINIC | Facility: MEDICAL CENTER | Age: 54
End: 2024-06-06

## 2024-06-06 NOTE — TELEPHONE ENCOUNTER
His right arm was slightly swollen with slight discomfort, but he does not mention any specific neurovascular concerns at the fingertips, however I asked him to call the office and please message me if he calls back next week without any further improvement and I will get him in for a visit.  I have asked him to move ahead with the echocardiogram so we can complete his cardiac workup, especially regarding the abnormal EKG despite no significant coronary artery disease

## 2024-06-07 PROBLEM — Z00.00 HEALTH MAINTENANCE EXAMINATION: Status: RESOLVED | Noted: 2024-05-08 | Resolved: 2024-06-07

## 2024-06-18 ENCOUNTER — NURSE TRIAGE (OUTPATIENT)
Age: 54
End: 2024-06-18

## 2024-06-18 ENCOUNTER — OFFICE VISIT (OUTPATIENT)
Dept: CARDIOLOGY CLINIC | Facility: CLINIC | Age: 54
End: 2024-06-18
Payer: COMMERCIAL

## 2024-06-18 VITALS
HEART RATE: 82 BPM | OXYGEN SATURATION: 99 % | BODY MASS INDEX: 26.08 KG/M2 | DIASTOLIC BLOOD PRESSURE: 70 MMHG | SYSTOLIC BLOOD PRESSURE: 114 MMHG | WEIGHT: 176.1 LBS | HEIGHT: 69 IN

## 2024-06-18 DIAGNOSIS — I10 ESSENTIAL HYPERTENSION: ICD-10-CM

## 2024-06-18 DIAGNOSIS — R07.2 PRECORDIAL PAIN: ICD-10-CM

## 2024-06-18 DIAGNOSIS — R94.39 ABNORMAL STRESS TEST: ICD-10-CM

## 2024-06-18 DIAGNOSIS — M79.601 RIGHT ARM PAIN: Primary | ICD-10-CM

## 2024-06-18 DIAGNOSIS — R09.89 DECREASED RADIAL PULSE: ICD-10-CM

## 2024-06-18 DIAGNOSIS — E78.00 HYPERCHOLESTEROLEMIA: ICD-10-CM

## 2024-06-18 DIAGNOSIS — F17.210 CIGARETTE NICOTINE DEPENDENCE WITHOUT COMPLICATION: ICD-10-CM

## 2024-06-18 PROBLEM — Z91.89 AT RISK FOR HEART DISEASE: Status: RESOLVED | Noted: 2024-05-08 | Resolved: 2024-06-18

## 2024-06-18 PROCEDURE — 99214 OFFICE O/P EST MOD 30 MIN: CPT | Performed by: INTERNAL MEDICINE

## 2024-06-18 RX ORDER — ATORVASTATIN CALCIUM 20 MG/1
20 TABLET, FILM COATED ORAL DAILY
Qty: 100 TABLET | Refills: 1 | Status: SHIPPED | OUTPATIENT
Start: 2024-06-18

## 2024-06-18 NOTE — TELEPHONE ENCOUNTER
"Reason for Disposition  • MILD pain (e.g., does not interfere with normal activities) and present > 7 days    Answer Assessment - Initial Assessment Questions  1. ONSET: \"When did the pain start?\"     After cath  2. LOCATION: \"Where is the pain located?\"      Wrist,to arm to shoulder   3. PAIN: \"How bad is the pain?\" (Scale 1-10; or mild, moderate, severe)    - MILD (1-3): doesn't interfere with normal activities    - MODERATE (4-7): interferes with normal activities (e.g., work or school) or awakens from sleep    - SEVERE (8-10): excruciating pain, unable to do any normal activities, unable to hold a cup of water      moderate  4. WORK OR EXERCISE: \"Has there been any recent work or exercise that involved this part of the body?\"      Pt does construction  5. CAUSE: \"What do you think is causing the arm pain?\"      Pt had a cath  6. OTHER SYMPTOMS: \"Do you have any other symptoms?\" (e.g., neck pain, swelling, rash, fever, numbness, weakness)      See call   7. PREGNANCY: \"Is there any chance you are pregnant?\" \"When was your last menstrual period?\"      N/a  Pt has an urgent appt with Dr Roy. Sent message to provider    Protocols used: Arm Pain-ADULT-[place]    "

## 2024-06-18 NOTE — ASSESSMENT & PLAN NOTE
, ASCVD risk 11%  Nonobstructive plaque discovered on cardiac catheterization after abnormal stress test  Statin therapy has been initiated

## 2024-06-18 NOTE — PROGRESS NOTES
[facility] Cardiology  Office Consultation  Christopher Sun 53 y.o. male MRN: [id number]        1. Right arm pain  Comments:  Discomfort from the wrist up to the shoulder, slight swelling, no bruising, no hematoma  Orders:  -     VAS upper limb arterial duplex, complete bilateral, graft; Future  2. Decreased radial pulse  Comments:  Right radial pulse reduced after cardiac catheterization, associated with right arm discomfort, but normal capillary refill.  Orders:  -     VAS upper limb arterial duplex, complete bilateral, graft; Future  3. Precordial pain  Assessment & Plan:  No macrovascular vessel stenosis by cardiac catheterization after abnormal stress testing  Could be small vessel disease, could consider microvascular testing in the future  4. Cigarette nicotine dependence without complication  Assessment & Plan:  Tobacco cessation is imperative  5. Essential hypertension  Assessment & Plan:  Blood pressure is well-controlled  6. Hypercholesterolemia  Assessment & Plan:  , ASCVD risk 11%  Nonobstructive plaque discovered on cardiac catheterization after abnormal stress test  Statin therapy has been initiated        Plan    STAT arterial doppler  Follow-up depending on the results of his Doppler        HPI: Christopher Sun is a 53 y.o. year old male with longtime smoking, untreated hypercholesterolemia, intermediate risk by ASCVD criteria, family history of premature CAD in his dad at age 55 or so, who has been having chest discomfort on and off sometimes at rest, sometimes with exertion prompting a stress test by his PCP.  Initially showed up for a treadmill stress test, but baseline ST segment abnormalities prompted a switch to Lexiscan, which was abnormal.  He saw me for consultation, he was referred for cardiac catheterization, he was found to have nonobstructive plaque.  He was initiated on aspirin and atorvastatin, LDL being 157.  Blood pressure today is normal.  He comes in for an urgent visit due to  persistent right arm discomfort after his cardiac catheterization.  There is no cyanosis, but he does feel like his hand is cooler when he wakes up in the morning.        Review of Systems   Constitutional:  Negative for appetite change, diaphoresis, fatigue and fever.   Respiratory:  Negative for chest tightness, shortness of breath and wheezing.    Cardiovascular:  Positive for chest pain. Negative for palpitations and leg swelling.   Gastrointestinal:  Negative for abdominal pain and blood in stool.   Musculoskeletal:  Negative for arthralgias and joint swelling.   Skin:  Negative for rash.   Neurological:  Negative for dizziness, syncope and light-headedness.         Past Medical History:   Diagnosis Date   • Arrhythmia 04/24/2013    after halter monitor-was told mild arythmia   • Hyperlipidemia    • Hypertension      Past Surgical History:   Procedure Laterality Date   • CARDIAC CATHETERIZATION Left 5/31/2024    Procedure: Cardiac Left Heart Cath;  Surgeon: David Clements MD;  Location: [facility];  Service: Cardiology   • CARDIAC CATHETERIZATION  5/31/2024    Procedure: Cardiac catheterization;  Surgeon: David Clements MD;  Location: [facility];  Service: Cardiology   • CARDIAC CATHETERIZATION N/A 5/31/2024    Procedure: Cardiac Coronary Angiogram;  Surgeon: David Clements MD;  Location: [facility];  Service: Cardiology     Social History     Substance and Sexual Activity   Alcohol Use Yes   • Alcohol/week: 6.0 standard drinks of alcohol   • Types: 6 Cans of beer per week     Social History     Substance and Sexual Activity   Drug Use Never     Social History     Tobacco Use   Smoking Status Every Day   • Current packs/day: 0.50   • Average packs/day: 0.5 packs/day for 30.0 years (15.0 ttl pk-yrs)   • Types: Cigarettes   Smokeless Tobacco Never     Family History   Problem Relation Age of Onset   • Diabetes Mother    • Hypertension Mother    • Cancer Mother    • Heart  disease Father        Allergies:  Allergies   Allergen Reactions   • Ephedrine Other (See Comments)     Face turns white       Medications:     Current Outpatient Medications:   •  aspirin (ECOTRIN LOW STRENGTH) 81 mg EC tablet, Take 81 mg by mouth daily, Disp: , Rfl:   •  atorvastatin (LIPITOR) 20 mg tablet, Take 1 tablet (20 mg total) by mouth daily, Disp: 100 tablet, Rfl: 1  •  fluticasone (FLONASE) 50 mcg/act nasal spray, 1 spray into each nostril daily, Disp: 48 g, Rfl: 3  ?    Vitals:    06/18/24 1559   BP: 114/70   Pulse: 82   SpO2: 99%     Weight (last 2 days)     Date/Time Weight    06/18/24 1559 79.9 (176.1)        Physical Exam  Constitutional:       General: He is not in acute distress.     Appearance: He is not diaphoretic.   HENT:      Head: Normocephalic and atraumatic.   Eyes:      General: No scleral icterus.     Conjunctiva/sclera: Conjunctivae normal.   Neck:      Vascular: No JVD.   Cardiovascular:      Rate and Rhythm: Normal rate and regular rhythm.      Heart sounds: Normal heart sounds. No murmur heard.     Comments: Reduced right radial pulse, but normal capillary refill, and Golden's test performed, with continued ulnar compression and right radial release resulted in fairly quick capillary refill  Pulmonary:      Effort: Pulmonary effort is normal. No respiratory distress.      Breath sounds: Normal breath sounds. No decreased breath sounds, wheezing, rhonchi or rales.   Musculoskeletal:      Cervical back: Normal range of motion.      Right lower leg: Normal. No edema.      Left lower leg: Normal. No edema.   Skin:     General: Skin is warm and dry.   Neurological:      Mental Status: He is alert and oriented to person, place, and time.           Laboratory Studies:    Labs personally reviewed    Cardiac testing:     EKG reviewed personally:   No results found for this visit on 06/18/24.    Echocardiogram:      Stress test:  5/20/2024-personally reviewed-[employer] stress  "test-baseline EKG abnormalities with lateral T wave inversion, anteroseptal reversible perfusion abnormality, inferior perfusion abnormality that resolves on prone imaging.    Holter:      Cardiac catheterization:  6/24-nonobstructive diffuse coronary artery disease      Adolfo Roy MD    Portions of the record may have been created with voice recognition software.  Occasional wrong word or \"sound a like\" substitutions may have occurred due to the inherent limitations of voice recognition software.  Read the chart carefully and recognize, using context, where substitutions have occurred.  "

## 2024-06-18 NOTE — TELEPHONE ENCOUNTER
Not a duplicate, pharmacy change    Reason for call:   [x] Refill   [] Prior Auth  [] Other:     Office:   [x] PCP/Provider -   [] Specialty/Provider -     Medication: Atorvastatin 20 mg, take 1 tablet by mouth daily      Pharmacy: [facility]     Does the patient have enough for 3 days?   [x] Yes   [] No - Send as HP to POD

## 2024-06-18 NOTE — TELEPHONE ENCOUNTER
----- Message from Sergio CASH sent at 6/18/2024  8:14 AM EDT -----  Patient Christopher [phone] contacting office had cardiac cath done and now experiencing (R) forearm, wrist and elbow continual pain which is now radiating to his shoulder.  Patient stated pain wakes him up @ night.

## 2024-06-18 NOTE — ASSESSMENT & PLAN NOTE
No macrovascular vessel stenosis by cardiac catheterization after abnormal stress testing  Could be small vessel disease, could consider microvascular testing in the future

## 2024-06-19 ENCOUNTER — HOSPITAL ENCOUNTER (OUTPATIENT)
Dept: VASCULAR ULTRASOUND | Facility: HOSPITAL | Age: 54
Discharge: HOME | End: 2024-06-19
Payer: COMMERCIAL

## 2024-06-19 DIAGNOSIS — M79.601 RIGHT ARM PAIN: ICD-10-CM

## 2024-06-19 DIAGNOSIS — R09.89 DECREASED RADIAL PULSE: ICD-10-CM

## 2024-06-19 DIAGNOSIS — I70.208 OCCLUSION OF RIGHT RADIAL ARTERY (HCC): Primary | ICD-10-CM

## 2024-06-19 PROCEDURE — 93931 UPPER EXTREMITY STUDY: CPT | Performed by: SURGERY

## 2024-06-19 PROCEDURE — 93931 UPPER EXTREMITY STUDY: CPT

## 2024-06-19 NOTE — PROGRESS NOTES
Right radial occluded after recent cath per arterial doppler.  Discussed with vascular, will try 3 months anticoagulation and reassess doppler.  Start eliquis 5mg BID

## 2024-07-16 ENCOUNTER — HOSPITAL ENCOUNTER (OUTPATIENT)
Dept: NON INVASIVE DIAGNOSTICS | Facility: CLINIC | Age: 54
Discharge: HOME | End: 2024-07-16
Payer: COMMERCIAL

## 2024-07-16 VITALS
WEIGHT: 176 LBS | HEART RATE: 70 BPM | SYSTOLIC BLOOD PRESSURE: 114 MMHG | HEIGHT: 69 IN | DIASTOLIC BLOOD PRESSURE: 70 MMHG | BODY MASS INDEX: 26.07 KG/M2

## 2024-07-16 DIAGNOSIS — R94.39 ABNORMAL STRESS TEST: ICD-10-CM

## 2024-07-16 DIAGNOSIS — I70.208 OCCLUSION OF RIGHT RADIAL ARTERY (HCC): Primary | ICD-10-CM

## 2024-07-16 LAB
AORTIC ROOT: 3.4 CM
APICAL FOUR CHAMBER EJECTION FRACTION: 62 %
ASCENDING AORTA: 3.4 CM
BSA FOR ECHO PROCEDURE: 1.96 M2
E WAVE DECELERATION TIME: 268 MS
E/A RATIO: 1
FRACTIONAL SHORTENING: 29 (ref 28–44)
INTERVENTRICULAR SEPTUM IN DIASTOLE (PARASTERNAL SHORT AXIS VIEW): 1.2 CM
INTERVENTRICULAR SEPTUM: 1.2 CM (ref 0.6–1.1)
IVC: 18 MM
LAAS-AP2: 11.3 CM2
LAAS-AP4: 9.2 CM2
LEFT ATRIUM SIZE: 2.9 CM
LEFT ATRIUM VOLUME (MOD BIPLANE): 21 ML
LEFT ATRIUM VOLUME INDEX (MOD BIPLANE): 10.7 ML/M2
LEFT INTERNAL DIMENSION IN SYSTOLE: 2.7 CM (ref 2.1–4)
LEFT VENTRICULAR INTERNAL DIMENSION IN DIASTOLE: 3.8 CM (ref 3.5–6)
LEFT VENTRICULAR POSTERIOR WALL IN END DIASTOLE: 1.2 CM
LEFT VENTRICULAR STROKE VOLUME: 37 ML
LVSV (TEICH): 37 ML
MV E'TISSUE VEL-SEP: 9 CM/S
MV PEAK A VEL: 0.64 M/S
MV PEAK E VEL: 64 CM/S
RIGHT ATRIUM AREA SYSTOLE A4C: 10.1 CM2
RIGHT VENTRICLE ID DIMENSION: 2.7 CM
SL CV LEFT ATRIUM LENGTH A2C: 3.9 CM
SL CV LV EF: 60
SL CV PED ECHO LEFT VENTRICLE DIASTOLIC VOLUME (MOD BIPLANE) 2D: 64 ML
SL CV PED ECHO LEFT VENTRICLE SYSTOLIC VOLUME (MOD BIPLANE) 2D: 27 ML
TR MAX PG: 15 MMHG
TR PEAK VELOCITY: 1.9 M/S
TRICUSPID ANNULAR PLANE SYSTOLIC EXCURSION: 3.5 CM
TRICUSPID VALVE PEAK REGURGITATION VELOCITY: 1.87 M/S

## 2024-07-16 PROCEDURE — 93306 TTE W/DOPPLER COMPLETE: CPT | Performed by: INTERNAL MEDICINE

## 2024-07-16 PROCEDURE — 93306 TTE W/DOPPLER COMPLETE: CPT

## 2024-11-15 DIAGNOSIS — R94.39 ABNORMAL STRESS TEST: ICD-10-CM

## 2024-11-16 RX ORDER — ATORVASTATIN CALCIUM 20 MG/1
20 TABLET, FILM COATED ORAL DAILY
Qty: 100 TABLET | Refills: 1 | Status: SHIPPED | OUTPATIENT
Start: 2024-11-16

## 2024-12-02 ENCOUNTER — HOSPITAL ENCOUNTER (OUTPATIENT)
Dept: NON INVASIVE DIAGNOSTICS | Facility: CLINIC | Age: 54
Discharge: HOME | End: 2024-12-02
Payer: COMMERCIAL

## 2024-12-02 DIAGNOSIS — I70.208 OCCLUSION OF RIGHT RADIAL ARTERY (HCC): ICD-10-CM

## 2024-12-02 PROCEDURE — 93931 UPPER EXTREMITY STUDY: CPT | Performed by: SURGERY

## 2024-12-02 PROCEDURE — 93931 UPPER EXTREMITY STUDY: CPT

## 2024-12-09 ENCOUNTER — RESULTS FOLLOW-UP (OUTPATIENT)
Dept: CARDIOLOGY CLINIC | Facility: CLINIC | Age: 54
End: 2024-12-09

## 2024-12-09 DIAGNOSIS — E78.00 HYPERCHOLESTEROLEMIA: Primary | ICD-10-CM

## 2025-01-06 ENCOUNTER — APPOINTMENT (OUTPATIENT)
Dept: LAB | Facility: HOSPITAL | Age: 55
End: 2025-01-06
Payer: COMMERCIAL

## 2025-01-06 DIAGNOSIS — E78.00 HYPERCHOLESTEROLEMIA: ICD-10-CM

## 2025-01-06 LAB
ALBUMIN SERPL BCG-MCNC: 4.3 G/DL (ref 3.5–5)
ALP SERPL-CCNC: 49 U/L (ref 34–104)
ALT SERPL W P-5'-P-CCNC: 17 U/L (ref 7–52)
AST SERPL W P-5'-P-CCNC: 16 U/L (ref 13–39)
BILIRUB DIRECT SERPL-MCNC: 0.07 MG/DL (ref 0–0.2)
BILIRUB SERPL-MCNC: 0.5 MG/DL (ref 0.2–1)
CHOLEST SERPL-MCNC: 154 MG/DL (ref ?–200)
HDLC SERPL-MCNC: 50 MG/DL
LDLC SERPL CALC-MCNC: 84 MG/DL (ref 0–100)
NONHDLC SERPL-MCNC: 104 MG/DL
PROT SERPL-MCNC: 7 G/DL (ref 6.4–8.4)
TRIGL SERPL-MCNC: 98 MG/DL (ref ?–150)

## 2025-01-06 PROCEDURE — 80061 LIPID PANEL: CPT

## 2025-01-06 PROCEDURE — 80076 HEPATIC FUNCTION PANEL: CPT

## 2025-01-07 LAB — LPA SERPL-SCNC: 60.8 NMOL/L

## 2025-01-08 ENCOUNTER — RESULTS FOLLOW-UP (OUTPATIENT)
Dept: CARDIOLOGY CLINIC | Facility: CLINIC | Age: 55
End: 2025-01-08

## 2025-01-29 ENCOUNTER — OFFICE VISIT (OUTPATIENT)
Dept: FAMILY MEDICINE CLINIC | Facility: CLINIC | Age: 55
End: 2025-01-29
Payer: COMMERCIAL

## 2025-01-29 VITALS
BODY MASS INDEX: 27.11 KG/M2 | HEIGHT: 69 IN | WEIGHT: 183 LBS | TEMPERATURE: 96.2 F | DIASTOLIC BLOOD PRESSURE: 84 MMHG | SYSTOLIC BLOOD PRESSURE: 120 MMHG | HEART RATE: 71 BPM | OXYGEN SATURATION: 100 %

## 2025-01-29 DIAGNOSIS — B35.1 ONYCHOMYCOSIS: ICD-10-CM

## 2025-01-29 DIAGNOSIS — B35.3 TINEA PEDIS OF RIGHT FOOT: Primary | ICD-10-CM

## 2025-01-29 PROCEDURE — 99214 OFFICE O/P EST MOD 30 MIN: CPT | Performed by: PHYSICIAN ASSISTANT

## 2025-01-29 RX ORDER — TERBINAFINE HYDROCHLORIDE 250 MG/1
250 TABLET ORAL DAILY
Qty: 84 TABLET | Refills: 0 | Status: SHIPPED | OUTPATIENT
Start: 2025-01-29 | End: 2025-04-23

## 2025-01-29 NOTE — PROGRESS NOTES
"Name: Christopher Sun      : 1970      MRN: [id number]  Encounter Provider: Fidel Alba PA-C  Encounter Date: 2025   Encounter department: [facility]    Assessment & Plan  Tinea pedis of right foot  12 weeks of oral terbinafine  Recent hepatic function panel normal  Orders:  •  terbinafine (LamISIL) 250 mg tablet; Take 1 tablet (250 mg total) by mouth daily    Onychomycosis    Orders:  •  terbinafine (LamISIL) 250 mg tablet; Take 1 tablet (250 mg total) by mouth daily         History of Present Illness     Pt presents with chronic \"athletes foot\" that is not responding to otc creams and also notes thickened/discolored toenails       Review of Systems   Constitutional: Negative.    Respiratory: Negative.     Cardiovascular: Negative.    Skin:  Positive for rash.        Toenail change     Past Medical History:   Diagnosis Date   • Arrhythmia 2013    after halter monitor-was told mild arythmia   • Hyperlipidemia    • Hypertension      Past Surgical History:   Procedure Laterality Date   • CARDIAC CATHETERIZATION Left 2024    Procedure: Cardiac Left Heart Cath;  Surgeon: David Clements MD;  Location: [facility];  Service: Cardiology   • CARDIAC CATHETERIZATION  2024    Procedure: Cardiac catheterization;  Surgeon: David Clements MD;  Location: [facility];  Service: Cardiology   • CARDIAC CATHETERIZATION N/A 2024    Procedure: Cardiac Coronary Angiogram;  Surgeon: David Clements MD;  Location: [facility];  Service: Cardiology     Family History   Problem Relation Age of Onset   • Diabetes Mother    • Hypertension Mother    • Cancer Mother    • Heart disease Father      Social History     Tobacco Use   • Smoking status: Every Day     Current packs/day: 0.50     Average packs/day: 0.5 packs/day for 30.0 years (15.0 ttl pk-yrs)     Types: Cigarettes   • Smokeless tobacco: Never   Vaping Use   • Vaping status: Never Used " "  Substance and Sexual Activity   • Alcohol use: Yes     Alcohol/week: 6.0 standard drinks of alcohol     Types: 6 Cans of beer per week   • Drug use: Never   • Sexual activity: Not on file     Current Outpatient Medications on File Prior to Visit   Medication Sig   • aspirin (ECOTRIN LOW STRENGTH) 81 mg EC tablet Take 81 mg by mouth daily   • atorvastatin (LIPITOR) 20 mg tablet Take 1 tablet (20 mg total) by mouth daily   • fluticasone (FLONASE) 50 mcg/act nasal spray 1 spray into each nostril daily     Allergies   Allergen Reactions   • Ephedrine Other (See Comments)     Face turns white     Immunization History   Administered Date(s) Administered   • COVID-19 MODERNA VACC 0.25 ML IM BOOSTER 01/12/2022   • COVID-19 MODERNA VACC 0.5 ML IM 04/02/2021, 04/30/2021   • Td (adult), adsorbed 08/21/2008   • Tdap 09/29/2021     Objective   /84 (BP Location: Left arm, Patient Position: Sitting, Cuff Size: Large)   Pulse 71   Temp (!) 96.2 °F (35.7 °C)   Ht 5' 9\" (1.753 m)   Wt 83 kg (183 lb)   SpO2 100%   BMI 27.02 kg/m²     Physical Exam  Vitals and nursing note reviewed.   Constitutional:       Appearance: Normal appearance.   Pulmonary:      Effort: Pulmonary effort is normal. No respiratory distress.   Skin:     Comments: Macerating cracking between toes of R foot with evidence of onychomycosis as well     Neurological:      Mental Status: He is alert and oriented to person, place, and time.         "

## 2025-05-14 DIAGNOSIS — R94.39 ABNORMAL STRESS TEST: ICD-10-CM

## 2025-05-14 RX ORDER — ATORVASTATIN CALCIUM 20 MG/1
20 TABLET, FILM COATED ORAL DAILY
Qty: 90 TABLET | Refills: 1 | Status: SHIPPED | OUTPATIENT
Start: 2025-05-14

## 2025-05-20 DIAGNOSIS — B35.1 ONYCHOMYCOSIS: ICD-10-CM

## 2025-05-20 DIAGNOSIS — B35.1 ONYCHOMYCOSIS: Primary | ICD-10-CM

## 2025-05-20 RX ORDER — FLUCONAZOLE 150 MG/1
150 TABLET ORAL WEEKLY
Qty: 4 TABLET | Refills: 0 | Status: SHIPPED | OUTPATIENT
Start: 2025-05-20 | End: 2025-05-21

## 2025-05-21 RX ORDER — FLUCONAZOLE 150 MG/1
150 TABLET ORAL WEEKLY
Qty: 4 TABLET | Refills: 0 | Status: SHIPPED | OUTPATIENT
Start: 2025-05-21 | End: 2025-12-11

## 2025-05-21 RX ORDER — FLUCONAZOLE 150 MG/1
150 TABLET ORAL WEEKLY
Qty: 4 TABLET | Refills: 0 | Status: SHIPPED | OUTPATIENT
Start: 2025-05-21 | End: 2025-05-21 | Stop reason: SDUPTHER

## (undated) DEVICE — GLIDESHEATH SLENDER STAINLESS STEEL KIT: Brand: GLIDESHEATH SLENDER

## (undated) DEVICE — DGW .035 FC J3MM 260CM TEF: Brand: EMERALD

## (undated) DEVICE — CATH DIAG 5FR IMPULSE 100CM FL3.5

## (undated) DEVICE — CATH DIAG 5FR IMPULSE 100CM FR4